# Patient Record
Sex: FEMALE | Race: WHITE | Employment: UNEMPLOYED | URBAN - METROPOLITAN AREA
[De-identification: names, ages, dates, MRNs, and addresses within clinical notes are randomized per-mention and may not be internally consistent; named-entity substitution may affect disease eponyms.]

---

## 2022-06-29 PROBLEM — R04.0 EPISTAXIS: Status: ACTIVE | Noted: 2022-06-29

## 2023-01-17 DIAGNOSIS — Z3A.24 24 WEEKS GESTATION OF PREGNANCY: ICD-10-CM

## 2023-01-17 DIAGNOSIS — G43.809 OTHER MIGRAINE WITHOUT STATUS MIGRAINOSUS, NOT INTRACTABLE: ICD-10-CM

## 2023-01-17 LAB
ALBUMIN SERPL-MCNC: 4 G/DL (ref 3.5–5.2)
ALP BLD-CCNC: 66 U/L (ref 35–104)
ALT SERPL-CCNC: 8 U/L (ref 0–32)
ANION GAP SERPL CALCULATED.3IONS-SCNC: 17 MMOL/L (ref 7–16)
AST SERPL-CCNC: 17 U/L (ref 0–31)
BACTERIA: ABNORMAL /HPF
BASOPHILS ABSOLUTE: 0.03 E9/L (ref 0–0.2)
BASOPHILS RELATIVE PERCENT: 0.3 % (ref 0–2)
BILIRUB SERPL-MCNC: 0.8 MG/DL (ref 0–1.2)
BILIRUBIN URINE: NEGATIVE
BLOOD, URINE: NEGATIVE
BUN BLDV-MCNC: 5 MG/DL (ref 6–20)
CALCIUM SERPL-MCNC: 9.6 MG/DL (ref 8.6–10.2)
CHLORIDE BLD-SCNC: 103 MMOL/L (ref 98–107)
CLARITY: CLEAR
CO2: 17 MMOL/L (ref 22–29)
COLOR: YELLOW
CREAT SERPL-MCNC: 0.5 MG/DL (ref 0.5–1)
CREATININE URINE: 114 MG/DL (ref 29–226)
EOSINOPHILS ABSOLUTE: 0.04 E9/L (ref 0.05–0.5)
EOSINOPHILS RELATIVE PERCENT: 0.4 % (ref 0–6)
EPITHELIAL CELLS, UA: ABNORMAL /HPF
FERRITIN: 30 NG/ML
FOLATE: >20 NG/ML (ref 4.8–24.2)
GFR SERPL CREATININE-BSD FRML MDRD: >60 ML/MIN/1.73
GLUCOSE BLD-MCNC: 91 MG/DL (ref 74–99)
GLUCOSE URINE: NEGATIVE MG/DL
HBA1C MFR BLD: 4.5 % (ref 4–5.6)
HCT VFR BLD CALC: 38 % (ref 34–48)
HEMOGLOBIN: 12.9 G/DL (ref 11.5–15.5)
IMMATURE GRANULOCYTES #: 0.04 E9/L
IMMATURE GRANULOCYTES %: 0.4 % (ref 0–5)
KETONES, URINE: NEGATIVE MG/DL
LACTATE DEHYDROGENASE: 194 U/L (ref 135–214)
LEUKOCYTE ESTERASE, URINE: NEGATIVE
LYMPHOCYTES ABSOLUTE: 2.31 E9/L (ref 1.5–4)
LYMPHOCYTES RELATIVE PERCENT: 23.8 % (ref 20–42)
MAGNESIUM: 1.7 MG/DL (ref 1.6–2.6)
MCH RBC QN AUTO: 32.6 PG (ref 26–35)
MCHC RBC AUTO-ENTMCNC: 33.9 % (ref 32–34.5)
MCV RBC AUTO: 96 FL (ref 80–99.9)
MONOCYTES ABSOLUTE: 0.54 E9/L (ref 0.1–0.95)
MONOCYTES RELATIVE PERCENT: 5.6 % (ref 2–12)
NEUTROPHILS ABSOLUTE: 6.76 E9/L (ref 1.8–7.3)
NEUTROPHILS RELATIVE PERCENT: 69.5 % (ref 43–80)
NITRITE, URINE: NEGATIVE
PDW BLD-RTO: 13 FL (ref 11.5–15)
PH UA: 6.5 (ref 5–9)
PLATELET # BLD: 313 E9/L (ref 130–450)
PMV BLD AUTO: 10.8 FL (ref 7–12)
POTASSIUM SERPL-SCNC: 4.3 MMOL/L (ref 3.5–5)
PROTEIN PROTEIN: 8 MG/DL (ref 0–12)
PROTEIN UA: NEGATIVE MG/DL
PROTEIN/CREAT RATIO: 0.1
PROTEIN/CREAT RATIO: 0.1 (ref 0–0.2)
RBC # BLD: 3.96 E12/L (ref 3.5–5.5)
RBC UA: ABNORMAL /HPF (ref 0–2)
SODIUM BLD-SCNC: 137 MMOL/L (ref 132–146)
SPECIFIC GRAVITY UA: 1.02 (ref 1–1.03)
T3 FREE: 2.8 PG/ML (ref 2–4.4)
T4 FREE: 0.96 NG/DL (ref 0.93–1.7)
TOTAL PROTEIN: 7.3 G/DL (ref 6.4–8.3)
TSH SERPL DL<=0.05 MIU/L-ACNC: 1.18 UIU/ML (ref 0.27–4.2)
URIC ACID, SERUM: 5.2 MG/DL (ref 2.4–5.7)
UROBILINOGEN, URINE: 0.2 E.U./DL
VITAMIN B-12: 172 PG/ML (ref 211–946)
VITAMIN D 25-HYDROXY: 32 NG/ML (ref 30–100)
WBC # BLD: 9.7 E9/L (ref 4.5–11.5)
WBC UA: ABNORMAL /HPF (ref 0–5)

## 2023-01-19 LAB — URINE CULTURE, ROUTINE: NORMAL

## 2023-01-20 PROBLEM — Z34.02 ENCOUNTER FOR SUPERVISION OF NORMAL FIRST PREGNANCY, SECOND TRIMESTER: Status: ACTIVE | Noted: 2023-01-20

## 2023-01-20 PROBLEM — E66.01 CLASS 3 SEVERE OBESITY DUE TO EXCESS CALORIES WITHOUT SERIOUS COMORBIDITY WITH BODY MASS INDEX (BMI) OF 40.0 TO 44.9 IN ADULT (HCC): Status: ACTIVE | Noted: 2023-01-20

## 2023-01-20 PROBLEM — E66.813 CLASS 3 SEVERE OBESITY DUE TO EXCESS CALORIES WITHOUT SERIOUS COMORBIDITY WITH BODY MASS INDEX (BMI) OF 40.0 TO 44.9 IN ADULT (HCC): Status: ACTIVE | Noted: 2023-01-20

## 2023-01-20 LAB
THYROGLOBULIN ANTIBODY: <12 IU/ML (ref 0–40)
THYROID PEROXIDASE (TPO) ABS: <4 IU/ML (ref 0–25)

## 2023-02-15 ENCOUNTER — ANCILLARY PROCEDURE (OUTPATIENT)
Dept: OBGYN CLINIC | Age: 23
End: 2023-02-15
Payer: COMMERCIAL

## 2023-02-15 ENCOUNTER — ROUTINE PRENATAL (OUTPATIENT)
Dept: OBGYN CLINIC | Age: 23
End: 2023-02-15
Payer: COMMERCIAL

## 2023-02-15 VITALS
BODY MASS INDEX: 41.11 KG/M2 | HEART RATE: 88 BPM | DIASTOLIC BLOOD PRESSURE: 83 MMHG | WEIGHT: 266.4 LBS | SYSTOLIC BLOOD PRESSURE: 124 MMHG

## 2023-02-15 DIAGNOSIS — R79.89 LOW VITAMIN D LEVEL: ICD-10-CM

## 2023-02-15 DIAGNOSIS — E53.8 VITAMIN B12 DEFICIENCY: ICD-10-CM

## 2023-02-15 DIAGNOSIS — R94.8 ABNORMAL PLACENTA FUNCTION TEST: ICD-10-CM

## 2023-02-15 DIAGNOSIS — Z34.03 PRIMIGRAVIDA IN THIRD TRIMESTER: ICD-10-CM

## 2023-02-15 DIAGNOSIS — E66.01 CLASS 3 SEVERE OBESITY DUE TO EXCESS CALORIES WITHOUT SERIOUS COMORBIDITY WITH BODY MASS INDEX (BMI) OF 40.0 TO 44.9 IN ADULT (HCC): ICD-10-CM

## 2023-02-15 DIAGNOSIS — Z3A.28 28 WEEKS GESTATION OF PREGNANCY: ICD-10-CM

## 2023-02-15 LAB
GLUCOSE URINE, POC: NORMAL
PROTEIN UA: NEGATIVE

## 2023-02-15 PROCEDURE — 81002 URINALYSIS NONAUTO W/O SCOPE: CPT | Performed by: OBSTETRICS & GYNECOLOGY

## 2023-02-15 PROCEDURE — 99214 OFFICE O/P EST MOD 30 MIN: CPT | Performed by: OBSTETRICS & GYNECOLOGY

## 2023-02-15 PROCEDURE — 76816 OB US FOLLOW-UP PER FETUS: CPT | Performed by: OBSTETRICS & GYNECOLOGY

## 2023-02-15 PROCEDURE — 76819 FETAL BIOPHYS PROFIL W/O NST: CPT | Performed by: OBSTETRICS & GYNECOLOGY

## 2023-02-15 PROCEDURE — 76820 UMBILICAL ARTERY ECHO: CPT | Performed by: OBSTETRICS & GYNECOLOGY

## 2023-02-15 PROCEDURE — 76821 MIDDLE CEREBRAL ARTERY ECHO: CPT | Performed by: OBSTETRICS & GYNECOLOGY

## 2023-02-15 PROCEDURE — 99213 OFFICE O/P EST LOW 20 MIN: CPT | Performed by: OBSTETRICS & GYNECOLOGY

## 2023-02-15 NOTE — PROGRESS NOTES
Denies any vaginal bleeding , cramping, or leaking of any fluids. Good fetal movement per patient. Urine for protein and glucose obtained with negative results.

## 2023-02-15 NOTE — LETTER
[facility] Maternal Fetal Medicine  [address]  [place] [address]  Phone: [phone]  Fax: [phone]           Malvin Veliz MD      February 15, 2023     Patient: Ashley Barry   MR Number: [id number]   YOB: 2000   Date of Visit: 2/15/2023       Dear Dr. Ash Wilson: Thank you for referring Ashley Barry to me for evaluation/treatment. Below are the relevant portions of my assessment and plan of care. If you have questions, please do not hesitate to call me. I look forward to following Shelly along with you. Sincerely,        Malvin Veliz MD    CC providers:  Derek Finley DO  [address] [place] [address] Floor  [facility] [place] [address]  Via In [facility]       February 15, 2023      Derek Finley DO  [address] [place] [address]     RE:  Tamar Kody  : 2000   AGE: 25 y.o. This report has been created using voice recognition software. It may contain errors which are inherent in voice recognition technology. Dear Dr. Ash Wilson:      I had the pleasure of meeting with Ms. Delano López for a return consultation. As you know, Ms. Delano López  is a 25 y.o.  at 29w1d ([address]) who is being followed by our office for multiple medical issues. Today, Ms. Delano López reports that she feels well. She notes good fetal movement and denies any symptoms of leaking of fluid, vaginal bleeding, and/or contractions. She had a fetal ultrasound that was notable for the following. There is a single intrauterine gestation in a cephalic presentation with a heart rate of 137 beats per minute. The placenta is anterior. The amniotic fluid index is 13.5 cm. The composite gestational age is 31w0d. The estimated fetal weight is at the 49th percentile. BPP 8/8. Umbilical artery Doppler studies are elevated. End-diastolic flow was seen. MCA PI normal.  MCA PSV normal.  CPR PI >1. Transabdominal cervical length 4.5 cm.       PERTINENT PHYSICAL EXAMINATION:   /83   Pulse 88   Wt 266 lb 6.4 oz (120.8 kg)   LMP 2022   BMI 41.11 kg/m²     Urine dipstick:   Negative for Glucose    Negative for Albumin      A fetal ultrasound assessment was performed today. A report is enclosed for your review. Assessment & Plan:  25 y.o.  at 28w1d ([address]) with:    1. Genetic counseling -- The patient was previously counseled regarding her options for genetic screening and/or diagnostic testing. Counseling was reviewed. The patient did not have any additional questions or concerns. The patient's options for genetic screening and/or diagnostic testing were reviewed including a quad screen, NIPT, and or amniocentesis. The risks and benefits of each were discussed. After this discussion, the patient indicated that she was undecided. A handout reviewing a panorama was provided. -- The patient declined genetic screening     The patient's prenatal record was reviewed. She had screening for cystic fibrosis that was negative. She was also counseled regarding the recommendations for screening for spinal muscular atrophy and Fragile X. The risks and benefits of screening were reviewed. After this discussion, she was undecided. She was provided a handout regarding a [employer] panel. -- The patient declined carrier screening        2. Obesity in pregnancy -- The patient reports that she is 5 foot 8 inches tall. She reports that her weight has remained stable. She was noted to weigh 260 pounds at 19 weeks 6 days. She weighed 266 pounds today. She has gained 1 pound since her last visit. Her BMI is 41. 11. Given the increased risks previously described, additional testing is recommended. Fetal growth should be monitored every 3-4 weeks starting at 24-26 weeks' gestation. Fetal growth was appropriate for the gestational age today. The patient should monitor fetal kick counts daily, starting at 28 weeks' gestation.   She should be scheduled for increased fetal surveillance with twice weekly fetal testing after 32 weeks' gestation. In the absence of any complications, delivery is recommended at 39 weeks' gestation. Given the increased risk for hypertensive disorders of pregnancy,  the potential utility of a low dose aspirin in reducing her risk for hypertensive disorders of pregnancy was reviewed. The risks and benefits of low dose aspirin were discussed. She was counseled that she could take 81 mg of aspirin, daily. A prescription was previously provided. Additional maternal evaluation was recommended with a nutrition panel, thyroid function studies, and a hemoglobin A1c. --Baseline testing was completed on 1/17/2023, the patient's results included: H/H12.9/38, MCV 96, platelet count 197,096, potassium 4.3, creatinine 0.5, calcium 9.6, ALT 8, AST 17, magnesium 1.7, ferritin 30, folate >20, vitamin B12 172, vitamin D 32, hemoglobin A1c 4.5%, TSH 1.18, free T40.96, free T32.8, TPO negative, antithyroglobulin antibody negative, , uric acid 5.2, urine culture no urinalysis negative, urine protein creatinine ratio 0.1.  --Additional recommendations are below. 3.   Migraine headaches -- The patient reported a history of migraine headaches. She reports that she was diagnosed 2 years ago. She follows with her PCP. She was using Imitrex as needed prior to pregnancy. She denies having any issues. Counseling was previously provided. Counseling was reviewed. The patient did not have any additional questions or concerns. The patient was counseled that migraine headaches can improve during pregnancy. However, in some patients symptoms are exacerbated. She was counseled regarding the use of magnesium oxide 400 mg twice daily and riboflavin 100 mg daily in reducing the frequency and intensity of migraine headaches.       Precautions were reviewed, and she was counseled that if she develops the worst headache of her life or a headache with neurological deficits, to present immediately for evaluation. She expressed verbal understanding of this counseling. Because of the patient's headache symptoms, a baseline nutrition panel and thyroid function testing was recommended. -- Baseline testing completed 1/17/2023. Results summarized above. Additional recommendations are below. 4.  Low vitamin D -- The patient's vitamin D was low at 32  --Recommend vitamin D3 2000 IU daily  --Monitor levels serially  --Monitor nutrition panel q4-6 weeks (CBC, CMP, magnesium, ferritin, folate, vitamin B12, vitamin D25OH), on/after 2/28/2023  --Follow up with PCP for long term monitoring and management    5. Vitamin B12 deficiency -- The patient's vitamin B12 was deficient at 172  --Recommend vitamin B12 1000 mcg PO daily  --Monitor levels serially  --Monitor nutrition panel q4-6 weeks (CBC, CMP, magnesium, ferritin, folate, vitamin B12, vitamin D25OH), repeat testing on/after 2/28/2023  --Follow up with PCP for long term monitoring and management      6. Elevated umbilical artery PI -- The umbilical artery PI was elevated. End-diastolic flow was seen. Overall, fetal growth was appropriate for gestational age. The patient was counseled that this finding can be indicative of placental dysfunction. It also may be associated with an increased risk for poor fetal growth, hypertensive disorders of pregnancy, and stillbirth. Thus, increased fetal surveillance is recommended. The patient was counseled to monitor fetal kick counts daily, instructions were reviewed. The patient should have a follow-up ultrasound weekly for biophysical profiles and repeat Doppler studies. Fetal growth should be reassessed in 2-3 weeks.       She should start twice-weekly testing at 32 weeks gestation, weekly ultrasound and weekly nonstress test.      --I requested the patient return for a follow-up assessment in 1 week unless there is a clinical reason for her to return prior to that time. She is to call if she has any problems or questions prior to her next visit. Further evaluation and management will be dependent on her clinical presentation and the results of her testing. --The patient was advised to call if she has any increased vaginal discharge, vaginal bleeding, contractions, abdominal pain, back pain or any new significant symptomatology prior to her next visit. I advised her that these are signs and symptoms of cervical change and require follow-up assessment when they occur. Preeclampsia precautions were also reviewed with the patient. --The patient was also counseled to call and/or return with any concerns for decreased fetal activity. --The patient is to continue to follow with you in your office for ongoing obstetric care. --The total time spent on today's visit was 30 minutes. This included preparation for the visit (i.e. reviewing prior external notes and test results), performance of a medically appropriate history and examination, counseling, orders for medications, tests or other procedures, and coordination of care. Greater than 50% of the time was spent face-to-face with the patient. This time is exclusive of procedures performed. I answered all of  the patient's questions to her satisfaction. I asked her to call if she had any additional questions prior to her next visit. --At the conclusion of the visit, the patient appeared to have a good understanding of the issues discussed. I answered all of her questions to her satisfaction. I asked her to call if she had any additional questions prior to her next visit. --Thank you for allowing me to participate in the care of this pleasant patient. Please don't hesitate to call me if you have any questions.       Sincerely,      Doug Smith MD, [facility] [address]  [phone]      *All or parts of this note may have been generated using a voice recognition program. There may be typo, grammar, or Word substitution errors that have escaped my review of this note.

## 2023-02-20 ENCOUNTER — ROUTINE PRENATAL (OUTPATIENT)
Dept: OBGYN CLINIC | Age: 23
End: 2023-02-20
Payer: COMMERCIAL

## 2023-02-20 ENCOUNTER — ANCILLARY PROCEDURE (OUTPATIENT)
Dept: OBGYN CLINIC | Age: 23
End: 2023-02-20
Payer: COMMERCIAL

## 2023-02-20 VITALS
SYSTOLIC BLOOD PRESSURE: 132 MMHG | WEIGHT: 272 LBS | BODY MASS INDEX: 41.97 KG/M2 | HEART RATE: 104 BPM | DIASTOLIC BLOOD PRESSURE: 82 MMHG

## 2023-02-20 DIAGNOSIS — Z3A.28 28 WEEKS GESTATION OF PREGNANCY: ICD-10-CM

## 2023-02-20 DIAGNOSIS — E53.8 VITAMIN B12 DEFICIENCY: ICD-10-CM

## 2023-02-20 DIAGNOSIS — R80.9 URINE PROTEIN INCREASED: ICD-10-CM

## 2023-02-20 DIAGNOSIS — R94.8 ABNORMAL PLACENTA FUNCTION TEST: ICD-10-CM

## 2023-02-20 DIAGNOSIS — E66.01 CLASS 3 SEVERE OBESITY DUE TO EXCESS CALORIES WITHOUT SERIOUS COMORBIDITY WITH BODY MASS INDEX (BMI) OF 40.0 TO 44.9 IN ADULT (HCC): Primary | ICD-10-CM

## 2023-02-20 DIAGNOSIS — R79.89 LOW VITAMIN D LEVEL: ICD-10-CM

## 2023-02-20 LAB
GLUCOSE URINE, POC: NORMAL
PROTEIN UA: POSITIVE

## 2023-02-20 PROCEDURE — 76821 MIDDLE CEREBRAL ARTERY ECHO: CPT | Performed by: OBSTETRICS & GYNECOLOGY

## 2023-02-20 PROCEDURE — 99213 OFFICE O/P EST LOW 20 MIN: CPT | Performed by: OBSTETRICS & GYNECOLOGY

## 2023-02-20 PROCEDURE — 76819 FETAL BIOPHYS PROFIL W/O NST: CPT | Performed by: OBSTETRICS & GYNECOLOGY

## 2023-02-20 PROCEDURE — 76820 UMBILICAL ARTERY ECHO: CPT | Performed by: OBSTETRICS & GYNECOLOGY

## 2023-02-20 PROCEDURE — 76815 OB US LIMITED FETUS(S): CPT | Performed by: OBSTETRICS & GYNECOLOGY

## 2023-02-20 PROCEDURE — 81002 URINALYSIS NONAUTO W/O SCOPE: CPT | Performed by: OBSTETRICS & GYNECOLOGY

## 2023-02-20 NOTE — PROGRESS NOTES
2023      Allison Ham DO  [address]     RE:  Ramo Ramires  : 2000   AGE: 25 y.o. This report has been created using voice recognition software. It may contain errors which are inherent in voice recognition technology. Dear Dr. Page Echeverria:      I had the pleasure of meeting with Ms. Vergara President for a return consultation. As you know, Ms. Vergara President  is a 25 y.o.  at 30w11d ([address] [place] [address]) who is being followed by our office for multiple medical issues. Today, Ms. Vergara President reports that she feels well. She notes good fetal movement and denies any symptoms of leaking of fluid, vaginal bleeding, and/or contractions. She had a fetal ultrasound that was notable for the following. There is a single intrauterine gestation in a cephalic presentation with a heart rate of 153 beats per minute. The placenta is anterior. The amniotic fluid index is 19.9 cm. BPP 8/8. Umbilical artery PI is intermittently elevated. End-diastolic flow is seen. MCA PI normal.  MCA PSV normal.  CPR PI >1.      PERTINENT PHYSICAL EXAMINATION:   /82   Pulse (!) 104   Wt 272 lb (123.4 kg)   LMP 2022   BMI 41.97 kg/m²     Urine dipstick:   Negative for Glucose    Trace for Albumin      A fetal ultrasound assessment was performed today. A report is enclosed for your review. Assessment & Plan:  25 y.o.  at 30w11d ([facility]) with:    1. Genetic counseling -- The patient was previously counseled regarding her options for genetic screening and/or diagnostic testing. Counseling was reviewed. The patient did not have any additional questions or concerns. The patient's options for genetic screening and/or diagnostic testing were reviewed including a quad screen, NIPT, and or amniocentesis. The risks and benefits of each were discussed. After this discussion, the patient indicated that she was undecided. A handout reviewing a panorama was provided.   -- The patient declined genetic screening     The patient's prenatal record was reviewed. She had screening for cystic fibrosis that was negative. She was also counseled regarding the recommendations for screening for spinal muscular atrophy and Fragile X. The risks and benefits of screening were reviewed. After this discussion, she was undecided. She was provided a handout regarding a [employer] panel. -- The patient declined carrier screening        2. Obesity in pregnancy -- The patient reports that she is 5 foot 8 inches tall. She reports that her weight has remained stable. She was noted to weigh 260 pounds at 19 weeks 6 days. She weighed 272 pounds today. She has gained 6 pounds since her last visit. Her BMI is 41.97. Given the increased risks previously described, additional testing is recommended. Fetal growth should be monitored every 3-4 weeks starting at 24-26 weeks' gestation. Fetal growth was appropriate for the gestational age at 35 weeks 1 day. he patient should monitor fetal kick counts daily, starting at 28 weeks' gestation. Instructions were reviewed. She should be scheduled for increased fetal surveillance with twice weekly fetal testing after 32 weeks' gestation. In the absence of any complications, delivery is recommended at 39 weeks' gestation. Given the increased risk for hypertensive disorders of pregnancy,  the potential utility of a low dose aspirin in reducing her risk for hypertensive disorders of pregnancy was reviewed. The risks and benefits of low dose aspirin were discussed. She was counseled that she could take 81 mg of aspirin, daily. A prescription was previously provided. Additional maternal evaluation was recommended with a nutrition panel, thyroid function studies, and a hemoglobin A1c.    --Baseline testing was completed on 1/17/2023, the patient's results included: H/H12.9/38, MCV 96, platelet count 040,407, potassium 4.3, creatinine 0.5, calcium 9.6, ALT 8, AST 17, magnesium 1.7, ferritin 30, folate >20, vitamin B12 172, vitamin D 32, hemoglobin A1c 4.5%, TSH 1.18, free T40.96, free T32.8, TPO negative, antithyroglobulin antibody negative, , uric acid 5.2, urine culture no urinalysis negative, urine protein creatinine ratio 0.1.  --Additional recommendations are below. 3.   Migraine headaches -- The patient reported a history of migraine headaches. She reports that she was diagnosed 2 years ago. She follows with her PCP. She was using Imitrex as needed prior to pregnancy. She again denies having any issues with migraine headaches. Counseling was previously provided. Counseling was reviewed. The patient did not have any additional questions or concerns. The patient was counseled that migraine headaches can improve during pregnancy. However, in some patients symptoms are exacerbated. She was counseled regarding the use of magnesium oxide 400 mg twice daily and riboflavin 100 mg daily in reducing the frequency and intensity of migraine headaches. Precautions were reviewed, and she was counseled that if she develops the worst headache of her life or a headache with neurological deficits, to present immediately for evaluation. She expressed verbal understanding of this counseling. Because of the patient's headache symptoms, a baseline nutrition panel and thyroid function testing was recommended. -- Baseline testing completed 1/17/2023. Results summarized above. Additional recommendations are below. 4.  Low vitamin D -- The patient's vitamin D was low at 32  --Recommend vitamin D3 2000 IU daily  --Monitor levels serially  --Monitor nutrition panel q4-6 weeks (CBC, CMP, magnesium, ferritin, folate, vitamin B12, vitamin D25OH), on/after 2/28/2023  --Follow up with PCP for long term monitoring and management     5.   Vitamin B12 deficiency -- The patient's vitamin B12 was deficient at 172  --Recommend vitamin B12 1000 mcg PO daily  --Monitor levels serially  --Monitor nutrition panel q4-6 weeks (CBC, CMP, magnesium, ferritin, folate, vitamin B12, vitamin D25OH), repeat testing on/after 2/28/2023  --Follow up with PCP for long term monitoring and management      6. Elevated umbilical artery PI -- The umbilical artery PI was noted to be elevated at 28 weeks 1 day. End-diastolic flow was seen. Overall, fetal growth was appropriate for gestational age. Counseling was provided to the patient. Counseling was reviewed. The patient was counseled that this finding can be indicative of placental dysfunction. It also may be associated with an increased risk for poor fetal growth, hypertensive disorders of pregnancy, and stillbirth. Thus, increased fetal surveillance is recommended. The patient returns today for follow-up testing. The umbilical artery PI was again intermittently elevated. End-diastolic flow was seen. MCA PI was normal.  The CPR PI was >1. The patient was counseled to monitor fetal kick counts daily, instructions were reviewed. The patient should have a follow-up ultrasound weekly for biophysical profiles and repeat Doppler studies. Fetal growth should be reassessed in 2-3 weeks. She should start twice-weekly testing at 32 weeks gestation, weekly ultrasound and weekly nonstress test.      7. Proteinuria -- The patient was noted to have trace protein on a urine dip today. She was normotensive with a blood pressure of 132/82 and she denied any signs or symptoms of preeclampsia. She denied any signs or symptoms of a urinary tract infection. She was given orders to have a urine culture and urine P/C ratio. If any abnormalities are detected, she will be contacted with results and follow-up recommendations. Preeclampsia precautions were reviewed with the patient.         --I requested the patient return for a follow-up assessment in 1 week unless there is a clinical reason for her to return prior to that time. She is to call if she has any problems or questions prior to her next visit. Further evaluation and management will be dependent on her clinical presentation and the results of her testing. --The patient was advised to call if she has any increased vaginal discharge, vaginal bleeding, contractions, abdominal pain, back pain or any new significant symptomatology prior to her next visit. I advised her that these are signs and symptoms of cervical change and require follow-up assessment when they occur. Preeclampsia precautions were also reviewed with the patient. --The patient was also counseled to call and/or return with any concerns for decreased fetal activity. --The patient is to continue to follow with you in your office for ongoing obstetric care. --The total time spent on today's visit was 20 minutes. This included preparation for the visit (i.e. reviewing prior external notes and test results), performance of a medically appropriate history and examination, counseling, orders for medications, tests or other procedures, and coordination of care. Greater than 50% of the time was spent face-to-face with the patient. This time is exclusive of procedures performed. I answered all of  the patient's questions to her satisfaction. I asked her to call if she had any additional questions prior to her next visit. --At the conclusion of the visit, the patient appeared to have a good understanding of the issues discussed. I answered all of her questions to her satisfaction. I asked her to call if she had any additional questions prior to her next visit. --Thank you for allowing me to participate in the care of this pleasant patient. Please don't hesitate to call me if you have any questions.       Sincerely,      Donny Moulton MD, [facility]  [phone]      *All or parts of this note may have been generated using a voice recognition program. There may be typo, grammar, or Word substitution errors that have escaped my review of this note.

## 2023-02-20 NOTE — LETTER
[address] Maternal Fetal Medicine  [address] [place] [facility] [address]  Phone: [phone]  Fax: [phone]           Fernando Flores MD      2023     Patient: Brenna Lopez   MR Number: [id number]   YOB: 2000   Date of Visit: 2023       Dear Dr. Bell Bass: Thank you for referring Brenna Lopez to me for evaluation/treatment. Below are the relevant portions of my assessment and plan of care. If you have questions, please do not hesitate to call me. I look forward to following Shelly along with you. Sincerely,        Fernando Flores MD    CC providers:  Vincent Langston DO  [address] [place] [address] Floor  [facility] [place] [address]  Via In [facility]       2023      Vincent Langston DO  [address] [place] [address]     RE:  Cady Barnard  : 2000   AGE: 25 y.o. This report has been created using voice recognition software. It may contain errors which are inherent in voice recognition technology. Dear Dr. Bell Bass:      I had the pleasure of meeting with Ms. Noe Meyers for a return consultation. As you know, Ms. Noe Meyers  is a 25 y.o.  at 30w11d ([address]) who is being followed by our office for multiple medical issues. Today, Ms. Noe Meyers reports that she feels well. She notes good fetal movement and denies any symptoms of leaking of fluid, vaginal bleeding, and/or contractions. She had a fetal ultrasound that was notable for the following. There is a single intrauterine gestation in a cephalic presentation with a heart rate of 153 beats per minute. The placenta is anterior. The amniotic fluid index is 19.9 cm. BPP 8/8. Umbilical artery PI is intermittently elevated. End-diastolic flow is seen.   MCA PI normal.  MCA PSV normal.  CPR PI >1.      PERTINENT PHYSICAL EXAMINATION:   /82   Pulse (!) 104   Wt 272 lb (123.4 kg)   LMP 2022   BMI 41.97 kg/m²     Urine dipstick: Negative for Glucose    Trace for Albumin      A fetal ultrasound assessment was performed today. A report is enclosed for your review. Assessment & Plan:  25 y.o.  at 30w11d ([address] [facility]) with:    1. Genetic counseling -- The patient was previously counseled regarding her options for genetic screening and/or diagnostic testing. Counseling was reviewed. The patient did not have any additional questions or concerns. The patient's options for genetic screening and/or diagnostic testing were reviewed including a quad screen, NIPT, and or amniocentesis. The risks and benefits of each were discussed. After this discussion, the patient indicated that she was undecided. A handout reviewing a panorama was provided. -- The patient declined genetic screening     The patient's prenatal record was reviewed. She had screening for cystic fibrosis that was negative. She was also counseled regarding the recommendations for screening for spinal muscular atrophy and Fragile X. The risks and benefits of screening were reviewed. After this discussion, she was undecided. She was provided a handout regarding a [employer] panel. -- The patient declined carrier screening        2. Obesity in pregnancy -- The patient reports that she is 5 foot 8 inches tall. She reports that her weight has remained stable. She was noted to weigh 260 pounds at 19 weeks 6 days. She weighed 272 pounds today. She has gained 6 pounds since her last visit. Her BMI is 41.97. Given the increased risks previously described, additional testing is recommended. Fetal growth should be monitored every 3-4 weeks starting at 24-26 weeks' gestation. Fetal growth was appropriate for the gestational age at 35 weeks 1 day. he patient should monitor fetal kick counts daily, starting at 28 weeks' gestation. Instructions were reviewed.   She should be scheduled for increased fetal surveillance with twice weekly fetal testing after 32 weeks' gestation. In the absence of any complications, delivery is recommended at 39 weeks' gestation. Given the increased risk for hypertensive disorders of pregnancy,  the potential utility of a low dose aspirin in reducing her risk for hypertensive disorders of pregnancy was reviewed. The risks and benefits of low dose aspirin were discussed. She was counseled that she could take 81 mg of aspirin, daily. A prescription was previously provided. Additional maternal evaluation was recommended with a nutrition panel, thyroid function studies, and a hemoglobin A1c. --Baseline testing was completed on 1/17/2023, the patient's results included: H/H12.9/38, MCV 96, platelet count 942,558, potassium 4.3, creatinine 0.5, calcium 9.6, ALT 8, AST 17, magnesium 1.7, ferritin 30, folate >20, vitamin B12 172, vitamin D 32, hemoglobin A1c 4.5%, TSH 1.18, free T40.96, free T32.8, TPO negative, antithyroglobulin antibody negative, , uric acid 5.2, urine culture no urinalysis negative, urine protein creatinine ratio 0.1.  --Additional recommendations are below. 3.   Migraine headaches -- The patient reported a history of migraine headaches. She reports that she was diagnosed 2 years ago. She follows with her PCP. She was using Imitrex as needed prior to pregnancy. She again denies having any issues with migraine headaches. Counseling was previously provided. Counseling was reviewed. The patient did not have any additional questions or concerns. The patient was counseled that migraine headaches can improve during pregnancy. However, in some patients symptoms are exacerbated. She was counseled regarding the use of magnesium oxide 400 mg twice daily and riboflavin 100 mg daily in reducing the frequency and intensity of migraine headaches.        Precautions were reviewed, and she was counseled that if she develops the worst headache of her life or a headache with neurological deficits, to present immediately for evaluation. She expressed verbal understanding of this counseling. Because of the patient's headache symptoms, a baseline nutrition panel and thyroid function testing was recommended. -- Baseline testing completed 1/17/2023. Results summarized above. Additional recommendations are below. 4.  Low vitamin D -- The patient's vitamin D was low at 32  --Recommend vitamin D3 2000 IU daily  --Monitor levels serially  --Monitor nutrition panel q4-6 weeks (CBC, CMP, magnesium, ferritin, folate, vitamin B12, vitamin D25OH), on/after 2/28/2023  --Follow up with PCP for long term monitoring and management     5. Vitamin B12 deficiency -- The patient's vitamin B12 was deficient at 172  --Recommend vitamin B12 1000 mcg PO daily  --Monitor levels serially  --Monitor nutrition panel q4-6 weeks (CBC, CMP, magnesium, ferritin, folate, vitamin B12, vitamin D25OH), repeat testing on/after 2/28/2023  --Follow up with PCP for long term monitoring and management      6. Elevated umbilical artery PI -- The umbilical artery PI was noted to be elevated at 28 weeks 1 day. End-diastolic flow was seen. Overall, fetal growth was appropriate for gestational age. Counseling was provided to the patient. Counseling was reviewed. The patient was counseled that this finding can be indicative of placental dysfunction. It also may be associated with an increased risk for poor fetal growth, hypertensive disorders of pregnancy, and stillbirth. Thus, increased fetal surveillance is recommended. The patient returns today for follow-up testing. The umbilical artery PI was again intermittently elevated. End-diastolic flow was seen. MCA PI was normal.  The CPR PI was >1. The patient was counseled to monitor fetal kick counts daily, instructions were reviewed. The patient should have a follow-up ultrasound weekly for biophysical profiles and repeat Doppler studies.        Fetal growth should be reassessed in 2-3 weeks. She should start twice-weekly testing at 32 weeks gestation, weekly ultrasound and weekly nonstress test.      7. Proteinuria -- The patient was noted to have trace protein on a urine dip today. She was normotensive with a blood pressure of 132/82 and she denied any signs or symptoms of preeclampsia. She denied any signs or symptoms of a urinary tract infection. She was given orders to have a urine culture and urine P/C ratio. If any abnormalities are detected, she will be contacted with results and follow-up recommendations. Preeclampsia precautions were reviewed with the patient. --I requested the patient return for a follow-up assessment in 1 week unless there is a clinical reason for her to return prior to that time. She is to call if she has any problems or questions prior to her next visit. Further evaluation and management will be dependent on her clinical presentation and the results of her testing. --The patient was advised to call if she has any increased vaginal discharge, vaginal bleeding, contractions, abdominal pain, back pain or any new significant symptomatology prior to her next visit. I advised her that these are signs and symptoms of cervical change and require follow-up assessment when they occur. Preeclampsia precautions were also reviewed with the patient. --The patient was also counseled to call and/or return with any concerns for decreased fetal activity. --The patient is to continue to follow with you in your office for ongoing obstetric care. --The total time spent on today's visit was 20 minutes. This included preparation for the visit (i.e. reviewing prior external notes and test results), performance of a medically appropriate history and examination, counseling, orders for medications, tests or other procedures, and coordination of care. Greater than 50% of the time was spent face-to-face with the patient.   This time is exclusive of procedures performed. I answered all of  the patient's questions to her satisfaction. I asked her to call if she had any additional questions prior to her next visit. --At the conclusion of the visit, the patient appeared to have a good understanding of the issues discussed. I answered all of her questions to her satisfaction. I asked her to call if she had any additional questions prior to her next visit. --Thank you for allowing me to participate in the care of this pleasant patient. Please don't hesitate to call me if you have any questions. Sincerely,      Joshua Blackwell MD, [facility] [address]  [phone]      *All or parts of this note may have been generated using a voice recognition program. There may be typo, grammar, or Word substitution errors that have escaped my review of this note.

## 2023-02-24 DIAGNOSIS — Z34.02 ENCOUNTER FOR SUPERVISION OF NORMAL FIRST PREGNANCY, SECOND TRIMESTER: ICD-10-CM

## 2023-02-27 ENCOUNTER — ROUTINE PRENATAL (OUTPATIENT)
Dept: OBGYN | Age: 23
End: 2023-02-27
Payer: COMMERCIAL

## 2023-02-27 VITALS
WEIGHT: 273 LBS | SYSTOLIC BLOOD PRESSURE: 138 MMHG | DIASTOLIC BLOOD PRESSURE: 75 MMHG | HEART RATE: 110 BPM | BODY MASS INDEX: 42.13 KG/M2

## 2023-02-27 DIAGNOSIS — Z34.93 ENCOUNTER FOR SUPERVISION OF NORMAL PREGNANCY IN THIRD TRIMESTER, UNSPECIFIED GRAVIDITY: Primary | ICD-10-CM

## 2023-02-27 PROBLEM — E53.8 VITAMIN B12 DEFICIENCY: Status: ACTIVE | Noted: 2023-02-27

## 2023-02-27 PROBLEM — R79.89 LOW VITAMIN D LEVEL: Status: ACTIVE | Noted: 2023-02-27

## 2023-02-27 PROBLEM — R94.8 ABNORMAL PLACENTA FUNCTION TEST: Status: ACTIVE | Noted: 2023-02-27

## 2023-02-27 PROBLEM — R80.9 URINE PROTEIN INCREASED: Status: ACTIVE | Noted: 2023-02-27

## 2023-02-27 LAB
ABO/RH: NORMAL
ANTIBODY SCREEN: NORMAL
GLUCOSE TOLERANCE SCREEN 50G: 156 MG/DL (ref 70–140)
GLUCOSE URINE, POC: NORMAL
HCT VFR BLD CALC: 38 % (ref 34–48)
HEMOGLOBIN: 12.8 G/DL (ref 11.5–15.5)
MCH RBC QN AUTO: 31.7 PG (ref 26–35)
MCHC RBC AUTO-ENTMCNC: 33.7 % (ref 32–34.5)
MCV RBC AUTO: 94.1 FL (ref 80–99.9)
PDW BLD-RTO: 12.4 FL (ref 11.5–15)
PLATELET # BLD: 285 E9/L (ref 130–450)
PMV BLD AUTO: 11.2 FL (ref 7–12)
PROTEIN UA: NEGATIVE
RBC # BLD: 4.04 E12/L (ref 3.5–5.5)
WBC # BLD: 9.8 E9/L (ref 4.5–11.5)

## 2023-02-27 PROCEDURE — 99213 OFFICE O/P EST LOW 20 MIN: CPT | Performed by: MIDWIFE

## 2023-02-27 PROCEDURE — 81002 URINALYSIS NONAUTO W/O SCOPE: CPT | Performed by: MIDWIFE

## 2023-02-27 SDOH — ECONOMIC STABILITY: FOOD INSECURITY: WITHIN THE PAST 12 MONTHS, YOU WORRIED THAT YOUR FOOD WOULD RUN OUT BEFORE YOU GOT MONEY TO BUY MORE.: NEVER TRUE

## 2023-02-27 SDOH — ECONOMIC STABILITY: FOOD INSECURITY: WITHIN THE PAST 12 MONTHS, THE FOOD YOU BOUGHT JUST DIDN'T LAST AND YOU DIDN'T HAVE MONEY TO GET MORE.: NEVER TRUE

## 2023-02-27 SDOH — ECONOMIC STABILITY: INCOME INSECURITY: HOW HARD IS IT FOR YOU TO PAY FOR THE VERY BASICS LIKE FOOD, HOUSING, MEDICAL CARE, AND HEATING?: NOT HARD AT ALL

## 2023-02-27 SDOH — ECONOMIC STABILITY: HOUSING INSECURITY
IN THE LAST 12 MONTHS, WAS THERE A TIME WHEN YOU DID NOT HAVE A STEADY PLACE TO SLEEP OR SLEPT IN A SHELTER (INCLUDING NOW)?: NO

## 2023-02-27 NOTE — PROGRESS NOTES
/Patient alert and pleasant with no complaints. Here today for prenatal visit. Fetal heart tones obtained without difficulty. Urine for glucose  3+ and protein obtained with negative results. Discharge instructions have been discussed with the patient. Patient advised to call our office with any questions or concerns. Voiced understanding.

## 2023-02-27 NOTE — PROGRESS NOTES
, return OB at 29w6d weeks    Patient Active Problem List   Diagnosis    Epistaxis    Encounter for supervision of normal first pregnancy, second trimester    Class 3 severe obesity due to excess calories without serious comorbidity with body mass index (BMI) of 40.0 to 44.9 in adult [facility])    Low vitamin D level    Vitamin B12 deficiency    Abnormal placenta function test    Urine protein increased        Subjective:  doing well    Bleeding no   Leaking of fluid no   Painful cramping/contractions no   Headache no   Epigastric pain no   Edema no     Fetal movement good, patient reports 10 movements in 2 hours    Objective:  See flowsheet    Vitals:    23 0938   BP: 138/75   Pulse: (!) 110       FH 28  FHT per flowsheet    1 hour GTT and CBC was done today. CBC WNL  O positive, negative antibody screen   1 hour   She had 3+ glucose in her urine. Assessment:   at 29w6d   Blood pressure WNL  Size equals dates  Total weight gain appropriate   rhogam:  Not eligible    ICD-10-CM    1. Encounter for supervision of normal pregnancy in third trimester, unspecified   Z34.93 POCT urine qual dipstick glucose     POCT urine qual dipstick protein           Plan:    RTO 2 weeks    Orders Placed This Encounter   Procedures    POCT urine qual dipstick glucose    POCT urine qual dipstick protein      Fetal movement:  Baby should move 10 times every 2 hours. If movements decrease below 10 in 2 hours, or decrease from what is typical for that pregnancy, patient should eat something, drink something, and lay down to count movements. If she does not feel 10 movements after doing these things, she is to immediately proceed to L&D for NST. She should NOT WAIT until the next day.  labor precautions discussed with patient. Patient should report >4 contractions/tightenings in 1 hour after first emptying bladder, laying down, drinking 2 large glasses of water.   Should also promptly report any vaginal bleeding, menstrual-type cramping, dysuria, urgency or frequency.

## 2023-02-28 LAB — RPR: NORMAL

## 2023-03-02 ENCOUNTER — ANCILLARY PROCEDURE (OUTPATIENT)
Dept: OBGYN CLINIC | Age: 23
End: 2023-03-02
Payer: COMMERCIAL

## 2023-03-02 ENCOUNTER — ROUTINE PRENATAL (OUTPATIENT)
Dept: OBGYN CLINIC | Age: 23
End: 2023-03-02
Payer: COMMERCIAL

## 2023-03-02 VITALS
HEART RATE: 72 BPM | DIASTOLIC BLOOD PRESSURE: 79 MMHG | BODY MASS INDEX: 42.13 KG/M2 | WEIGHT: 273 LBS | SYSTOLIC BLOOD PRESSURE: 129 MMHG

## 2023-03-02 DIAGNOSIS — R94.8 ABNORMAL PLACENTA FUNCTION TEST: ICD-10-CM

## 2023-03-02 DIAGNOSIS — Z34.02 ENCOUNTER FOR SUPERVISION OF NORMAL FIRST PREGNANCY, SECOND TRIMESTER: Primary | ICD-10-CM

## 2023-03-02 DIAGNOSIS — E66.01 CLASS 3 SEVERE OBESITY DUE TO EXCESS CALORIES WITHOUT SERIOUS COMORBIDITY WITH BODY MASS INDEX (BMI) OF 40.0 TO 44.9 IN ADULT (HCC): ICD-10-CM

## 2023-03-02 DIAGNOSIS — R80.9 URINE PROTEIN INCREASED: ICD-10-CM

## 2023-03-02 DIAGNOSIS — R79.89 LOW VITAMIN D LEVEL: ICD-10-CM

## 2023-03-02 DIAGNOSIS — E53.8 VITAMIN B12 DEFICIENCY: ICD-10-CM

## 2023-03-02 LAB
GLUCOSE URINE, POC: NORMAL
PROTEIN UA: NEGATIVE

## 2023-03-02 PROCEDURE — 99214 OFFICE O/P EST MOD 30 MIN: CPT | Performed by: OBSTETRICS & GYNECOLOGY

## 2023-03-02 PROCEDURE — 76816 OB US FOLLOW-UP PER FETUS: CPT | Performed by: OBSTETRICS & GYNECOLOGY

## 2023-03-02 PROCEDURE — 99213 OFFICE O/P EST LOW 20 MIN: CPT | Performed by: OBSTETRICS & GYNECOLOGY

## 2023-03-02 PROCEDURE — 76820 UMBILICAL ARTERY ECHO: CPT | Performed by: OBSTETRICS & GYNECOLOGY

## 2023-03-02 PROCEDURE — 76821 MIDDLE CEREBRAL ARTERY ECHO: CPT | Performed by: OBSTETRICS & GYNECOLOGY

## 2023-03-02 PROCEDURE — 81002 URINALYSIS NONAUTO W/O SCOPE: CPT | Performed by: OBSTETRICS & GYNECOLOGY

## 2023-03-02 PROCEDURE — 76819 FETAL BIOPHYS PROFIL W/O NST: CPT | Performed by: OBSTETRICS & GYNECOLOGY

## 2023-03-02 NOTE — PROGRESS NOTES
2023      Elias Alfred DO  [address]     RE:  Sara Fontenot  : 2000   AGE: 25 y.o. This report has been created using voice recognition software. It may contain errors which are inherent in voice recognition technology. Dear Dr. Araseli Zapata:      I had the pleasure of meeting with Ms. Macrina Lund for a return consultation. As you know, Ms. Macrina Lund  is a 25 y.o.  at 27w4d ([address]) who is being followed by our office for multiple medical issues. Today, Ms. Macrina Lund reports that she feels well. She notes good fetal movement and denies any symptoms of leaking of fluid, vaginal bleeding, and/or contractions. She had a fetal ultrasound that was notable for the following. There is a single intrauterine gestation in a cephalic presentation with a heart rate of 59 beats per minute. The placenta is anterior. The amniotic fluid index is 16.6 cm. The composite gestational age is 30w3d. The estimated fetal weight is at the 56th percentile. BPP 8/8. Umbilical artery PI intermittently elevated. Diastolic closed. MCA has been normal.  MCA PI normal.  CPR PI normal.      PERTINENT PHYSICAL EXAMINATION:   /79   Pulse 72   Wt 273 lb (123.8 kg)   LMP 2022   BMI 42.13 kg/m²     Urine dipstick:   Negative for Glucose    Negative for Albumin      A fetal ultrasound assessment was performed today. A report is enclosed for your review. Assessment & Plan:  25 y.o.  at 30w2d (7-week US) with:    1. Genetic counseling -- The patient was previously counseled regarding her options for genetic screening and/or diagnostic testing. Counseling was reviewed. The patient did not have any additional questions or concerns. The patient's options for genetic screening and/or diagnostic testing were reviewed including a quad screen, NIPT, and or amniocentesis. The risks and benefits of each were discussed.   After this discussion, the patient indicated that she was undecided. A handout reviewing a panorama was provided. -- The patient declined genetic screening     The patient's prenatal record was reviewed. She had screening for cystic fibrosis that was negative. She was also counseled regarding the recommendations for screening for spinal muscular atrophy and Fragile X. The risks and benefits of screening were reviewed. After this discussion, she was undecided. She was provided a handout regarding a [employer] panel. -- The patient declined carrier screening        2. Obesity in pregnancy -- The patient reports that she is 5 foot 8 inches tall. She reports that her weight has remained stable. She was noted to weigh 260 pounds at 19 weeks 6 days. She weighed 273 pounds today. She has gained 1 pound since her last visit. Her BMI is 42. 13. Given the increased risks previously described, additional testing is recommended. Fetal growth should be monitored every 3-4 weeks starting at 24-26 weeks' gestation. Fetal growth was appropriate for the gestational age at 31 weeks 2 day. Umbilical artery PI was intermittently elevated. See below. The patient should monitor fetal kick counts daily, starting at 28 weeks' gestation. Instructions were reviewed. She should be scheduled for increased fetal surveillance with twice weekly fetal testing after 32 weeks' gestation. In the absence of any complications, delivery is recommended at 39 weeks' gestation. Given the increased risk for hypertensive disorders of pregnancy,  the potential utility of a low dose aspirin in reducing her risk for hypertensive disorders of pregnancy was reviewed. The risks and benefits of low dose aspirin were discussed. She was counseled that she could take 81 mg of aspirin, daily. A prescription was previously provided. Additional maternal evaluation was recommended with a nutrition panel, thyroid function studies, and a hemoglobin A1c.    --Baseline testing was completed on 1/17/2023, the patient's results included: H/H12.9/38, MCV 96, platelet count 693,391, potassium 4.3, creatinine 0.5, calcium 9.6, ALT 8, AST 17, magnesium 1.7, ferritin 30, folate >20, vitamin B12 172, vitamin D 32, hemoglobin A1c 4.5%, TSH 1.18, free T40.96, free T32.8, TPO negative, antithyroglobulin antibody negative, , uric acid 5.2, urine culture no urinalysis negative, urine protein creatinine ratio 0.1.  --Additional recommendations are below. 3.   Migraine headaches -- The patient reported a history of migraine headaches. She reports that she was diagnosed 2 years ago. She follows with her PCP. She was using Imitrex as needed prior to pregnancy. She again denies having any issues with migraine headaches. Counseling was previously provided. Counseling was reviewed. The patient did not have any additional questions or concerns. The patient was counseled that migraine headaches can improve during pregnancy. However, in some patients symptoms are exacerbated. She was counseled regarding the use of magnesium oxide 400 mg twice daily and riboflavin 100 mg daily in reducing the frequency and intensity of migraine headaches. Precautions were reviewed, and she was counseled that if she develops the worst headache of her life or a headache with neurological deficits, to present immediately for evaluation. She expressed verbal understanding of this counseling. Because of the patient's headache symptoms, a baseline nutrition panel and thyroid function testing was recommended. -- Baseline testing completed 1/17/2023. Results summarized above. Additional recommendations are below.      4.  Low vitamin D -- The patient's vitamin D was low at 32  --Recommend vitamin D3 2000 IU daily  --Monitor levels serially  --Monitor nutrition panel q4-6 weeks (CBC, CMP, magnesium, ferritin, folate, vitamin B12, vitamin D25OH), on/after 2/28/2023  --Follow up with PCP for long term monitoring and management     5. Vitamin B12 deficiency -- The patient's vitamin B12 was deficient at 172  --Recommend vitamin B12 1000 mcg PO daily  --Monitor levels serially  --Monitor nutrition panel q4-6 weeks (CBC, CMP, magnesium, ferritin, folate, vitamin B12, vitamin D25OH), repeat testing on/after 2/28/2023  --Follow up with PCP for long term monitoring and management      6. Elevated umbilical artery PI -- The umbilical artery PI was noted to be elevated at 28 weeks 1 day. End-diastolic flow was seen. Overall, fetal growth was appropriate for gestational age. Testing was repeated at 30 weeks 2 days. The umbilical artery PI was again intermittently elevated. End-diastolic flow was seen. Fetal testing was otherwise reassuring. Physical profile score 8/8, a normal ARASELI, and a normal MCA PI. Counseling was previously provided to the patient. Counseling was reviewed. The patient was counseled that this finding can be indicative of placental dysfunction. It also may be associated with an increased risk for poor fetal growth, hypertensive disorders of pregnancy, and stillbirth. Thus, increased fetal surveillance is recommended. The patient was counseled continue a daily low-dose aspirin. The patient was counseled to monitor fetal kick counts daily, instructions were reviewed. The patient should have a follow-up ultrasound weekly for biophysical profiles and repeat Doppler studies. Fetal growth should be reassessed in 2 weeks. She should start twice-weekly testing at 32 weeks gestation, weekly ultrasound and weekly nonstress test.        7. Proteinuria -- The patient was noted to have trace protein on a urine dip at 20 weeks 6 days. She was normotensive with a blood pressure of 132/82 and she denied any signs or symptoms of preeclampsia. She denied any signs or symptoms of a urinary tract infection.   She was given orders to have a urine culture and urine P/C ratio. If any abnormalities are detected, she will be contacted with results and follow-up recommendations. The patient's urine negative for protein today. Her blood pressure was normal at 129/79. Preeclampsia precautions were reviewed with the patient. --I requested the patient return for a follow-up assessment in 1 week unless there is a clinical reason for her to return prior to that time. She is to call if she has any problems or questions prior to her next visit. Further evaluation and management will be dependent on her clinical presentation and the results of her testing. --The patient was advised to call if she has any increased vaginal discharge, vaginal bleeding, contractions, abdominal pain, back pain or any new significant symptomatology prior to her next visit. I advised her that these are signs and symptoms of cervical change and require follow-up assessment when they occur. Preeclampsia precautions were also reviewed with the patient. --The patient was also counseled to call and/or return with any concerns for decreased fetal activity. --The patient is to continue to follow with you in your office for ongoing obstetric care. --The total time spent on today's visit was 30 minutes. This included preparation for the visit (i.e. reviewing prior external notes and test results), performance of a medically appropriate history and examination, counseling, orders for medications, tests or other procedures, and coordination of care. Greater than 50% of the time was spent face-to-face with the patient. This time is exclusive of procedures performed. I answered all of  the patient's questions to her satisfaction. I asked her to call if she had any additional questions prior to her next visit. --At the conclusion of the visit, the patient appeared to have a good understanding of the issues discussed. I answered all of her questions to her satisfaction.  I asked her to call if she had any additional questions prior to her next visit. --Thank you for allowing me to participate in the care of this pleasant patient. Please don't hesitate to call me if you have any questions. Sincerely,      Myra Hood MD, [address] [facility]  [phone] [address], 3rd floor, [facility], [address] [place] [address]      *All or parts of this note may have been generated using a voice recognition program. There may be typo, grammar, or Word substitution errors that have escaped my review of this note.

## 2023-03-02 NOTE — LETTER
2023      Yayo Hadley DO  [address] [facility]  3rd [address] [facility]      Patient: Tamar Villanueva   MR Number: [id number]   YOB: 2000   Date of Visit: 3/2/2023       Dear Dr. Erika Manley: Thank you for referring Tamar Villanueva to me for evaluation/treatment. Below are the relevant portions of my assessment and plan of care. If you have questions, please do not hesitate to call me. I look forward to following Shelly along with you. Sincerely,        Rosemary Khalil MD    CC providers:  Yayo Hadley DO  [address] [address]  Via In Basket     2023      Yayopalmira Hadley DO  [address] [place] [address]     RE:  Mita Fuchs  : 2000   AGE: 25 y.o. This report has been created using voice recognition software. It may contain errors which are inherent in voice recognition technology. Dear Dr. Erika Manley:      I had the pleasure of meeting with Ms. Addi Sanon for a return consultation. As you know, Ms. Addi Sanon  is a 25 y.o.  at 27w4d ([address]) who is being followed by our office for multiple medical issues. Today, Ms. Addi Sanon reports that she feels well. She notes good fetal movement and denies any symptoms of leaking of fluid, vaginal bleeding, and/or contractions. She had a fetal ultrasound that was notable for the following. There is a single intrauterine gestation in a cephalic presentation with a heart rate of 59 beats per minute. The placenta is anterior. The amniotic fluid index is 16.6 cm. The composite gestational age is 30w3d. The estimated fetal weight is at the 56th percentile. BPP 8/8. Umbilical artery PI intermittently elevated. Diastolic closed.   MCA has been normal.  MCA PI normal.  CPR PI normal.      PERTINENT PHYSICAL EXAMINATION:   /79   Pulse 72   Wt 273 lb (123.8 kg)   LMP 2022   BMI 42.13 kg/m²     Urine dipstick:   Negative for Glucose    Negative for Albumin      A fetal ultrasound assessment was performed today. A report is enclosed for your review. Assessment & Plan:  25 y.o.  at 30w2d (7-week US) with:    1. Genetic counseling -- The patient was previously counseled regarding her options for genetic screening and/or diagnostic testing. Counseling was reviewed. The patient did not have any additional questions or concerns. The patient's options for genetic screening and/or diagnostic testing were reviewed including a quad screen, NIPT, and or amniocentesis. The risks and benefits of each were discussed. After this discussion, the patient indicated that she was undecided. A handout reviewing a panorama was provided. -- The patient declined genetic screening     The patient's prenatal record was reviewed. She had screening for cystic fibrosis that was negative. She was also counseled regarding the recommendations for screening for spinal muscular atrophy and Fragile X. The risks and benefits of screening were reviewed. After this discussion, she was undecided. She was provided a handout regarding a [employer] panel. -- The patient declined carrier screening        2. Obesity in pregnancy -- The patient reports that she is 5 foot 8 inches tall. She reports that her weight has remained stable. She was noted to weigh 260 pounds at 19 weeks 6 days. She weighed 273 pounds today. She has gained 1 pound since her last visit. Her BMI is 42. 13. Given the increased risks previously described, additional testing is recommended. Fetal growth should be monitored every 3-4 weeks starting at 24-26 weeks' gestation. Fetal growth was appropriate for the gestational age at 31 weeks 2 day. Umbilical artery PI was intermittently elevated. See below. The patient should monitor fetal kick counts daily, starting at 28 weeks' gestation. Instructions were reviewed.   She should be scheduled for increased fetal surveillance with twice weekly fetal testing after 32 weeks' gestation. In the absence of any complications, delivery is recommended at 39 weeks' gestation. Given the increased risk for hypertensive disorders of pregnancy,  the potential utility of a low dose aspirin in reducing her risk for hypertensive disorders of pregnancy was reviewed. The risks and benefits of low dose aspirin were discussed. She was counseled that she could take 81 mg of aspirin, daily. A prescription was previously provided. Additional maternal evaluation was recommended with a nutrition panel, thyroid function studies, and a hemoglobin A1c. --Baseline testing was completed on 1/17/2023, the patient's results included: H/H12.9/38, MCV 96, platelet count 459,032, potassium 4.3, creatinine 0.5, calcium 9.6, ALT 8, AST 17, magnesium 1.7, ferritin 30, folate >20, vitamin B12 172, vitamin D 32, hemoglobin A1c 4.5%, TSH 1.18, free T40.96, free T32.8, TPO negative, antithyroglobulin antibody negative, , uric acid 5.2, urine culture no urinalysis negative, urine protein creatinine ratio 0.1.  --Additional recommendations are below. 3.   Migraine headaches -- The patient reported a history of migraine headaches. She reports that she was diagnosed 2 years ago. She follows with her PCP. She was using Imitrex as needed prior to pregnancy. She again denies having any issues with migraine headaches. Counseling was previously provided. Counseling was reviewed. The patient did not have any additional questions or concerns. The patient was counseled that migraine headaches can improve during pregnancy. However, in some patients symptoms are exacerbated. She was counseled regarding the use of magnesium oxide 400 mg twice daily and riboflavin 100 mg daily in reducing the frequency and intensity of migraine headaches.        Precautions were reviewed, and she was counseled that if she develops the worst headache of her life or a headache with neurological deficits, to present immediately for evaluation.  She expressed verbal understanding of this counseling.     Because of the patient's headache symptoms, a baseline nutrition panel and thyroid function testing was recommended.    -- Baseline testing completed 1/17/2023.  Results summarized above.  Additional recommendations are below.     4.  Low vitamin D -- The patient's vitamin D was low at 32  --Recommend vitamin D3 2000 IU daily  --Monitor levels serially  --Monitor nutrition panel q4-6 weeks (CBC, CMP, magnesium, ferritin, folate, vitamin B12, vitamin D25OH), on/after 2/28/2023  --Follow up with PCP for long term monitoring and management     5.  Vitamin B12 deficiency -- The patient's vitamin B12 was deficient at 172  --Recommend vitamin B12 1000 mcg PO daily  --Monitor levels serially  --Monitor nutrition panel q4-6 weeks (CBC, CMP, magnesium, ferritin, folate, vitamin B12, vitamin D25OH), repeat testing on/after 2/28/2023  --Follow up with PCP for long term monitoring and management      6.  Elevated umbilical artery PI -- The umbilical artery PI was noted to be elevated at 28 weeks 1 day.  End-diastolic flow was seen. Overall, fetal growth was appropriate for gestational age.      Testing was repeated at 30 weeks 2 days.  The umbilical artery PI was again intermittently elevated.  End-diastolic flow was seen.    Fetal testing was otherwise reassuring.  Physical profile score 8/8, a normal ARASELI, and a normal MCA PI.     Counseling was previously provided to the patient.  Counseling was reviewed.     The patient was counseled that this finding can be indicative of placental dysfunction. It also may be associated with an increased risk for poor fetal growth, hypertensive disorders of pregnancy, and stillbirth. Thus, increased fetal surveillance is recommended.       The patient was counseled continue a daily low-dose aspirin.     The patient was counseled to monitor fetal kick counts  daily, instructions were reviewed. The patient should have a follow-up ultrasound weekly for biophysical profiles and repeat Doppler studies. Fetal growth should be reassessed in 2 weeks. She should start twice-weekly testing at 32 weeks gestation, weekly ultrasound and weekly nonstress test.        7. Proteinuria -- The patient was noted to have trace protein on a urine dip at 20 weeks 6 days. She was normotensive with a blood pressure of 132/82 and she denied any signs or symptoms of preeclampsia. She denied any signs or symptoms of a urinary tract infection. She was given orders to have a urine culture and urine P/C ratio. If any abnormalities are detected, she will be contacted with results and follow-up recommendations. The patient's urine negative for protein today. Her blood pressure was normal at 129/79. Preeclampsia precautions were reviewed with the patient. --I requested the patient return for a follow-up assessment in 1 week unless there is a clinical reason for her to return prior to that time. She is to call if she has any problems or questions prior to her next visit. Further evaluation and management will be dependent on her clinical presentation and the results of her testing. --The patient was advised to call if she has any increased vaginal discharge, vaginal bleeding, contractions, abdominal pain, back pain or any new significant symptomatology prior to her next visit. I advised her that these are signs and symptoms of cervical change and require follow-up assessment when they occur. Preeclampsia precautions were also reviewed with the patient. --The patient was also counseled to call and/or return with any concerns for decreased fetal activity. --The patient is to continue to follow with you in your office for ongoing obstetric care. --The total time spent on today's visit was 30 minutes.   This included preparation for the visit (i.e. reviewing prior external notes and test results), performance of a medically appropriate history and examination, counseling, orders for medications, tests or other procedures, and coordination of care. Greater than 50% of the time was spent face-to-face with the patient. This time is exclusive of procedures performed. I answered all of  the patient's questions to her satisfaction. I asked her to call if she had any additional questions prior to her next visit. --At the conclusion of the visit, the patient appeared to have a good understanding of the issues discussed. I answered all of her questions to her satisfaction. I asked her to call if she had any additional questions prior to her next visit. --Thank you for allowing me to participate in the care of this pleasant patient. Please don't hesitate to call me if you have any questions. Sincerely,      Evens Amezcua MD, [address] [facility]  [phone] option 555 [address], 3rd floor, [facility], [address] [place] [address]      *All or parts of this note may have been generated using a voice recognition program. There may be typo, grammar, or Word substitution errors that have escaped my review of this note.

## 2023-03-10 ENCOUNTER — ROUTINE PRENATAL (OUTPATIENT)
Dept: OBGYN CLINIC | Age: 23
End: 2023-03-10
Payer: COMMERCIAL

## 2023-03-10 ENCOUNTER — TELEPHONE (OUTPATIENT)
Dept: OBGYN | Age: 23
End: 2023-03-10

## 2023-03-10 ENCOUNTER — ANCILLARY PROCEDURE (OUTPATIENT)
Dept: OBGYN CLINIC | Age: 23
End: 2023-03-10
Payer: COMMERCIAL

## 2023-03-10 VITALS
SYSTOLIC BLOOD PRESSURE: 123 MMHG | HEART RATE: 74 BPM | BODY MASS INDEX: 41.66 KG/M2 | DIASTOLIC BLOOD PRESSURE: 73 MMHG | WEIGHT: 270 LBS

## 2023-03-10 DIAGNOSIS — Z3A.31 31 WEEKS GESTATION OF PREGNANCY: ICD-10-CM

## 2023-03-10 DIAGNOSIS — E63.9 NUTRITIONAL DEFICIENCY: ICD-10-CM

## 2023-03-10 DIAGNOSIS — R80.9 URINE PROTEIN INCREASED: ICD-10-CM

## 2023-03-10 DIAGNOSIS — Z34.02 ENCOUNTER FOR SUPERVISION OF NORMAL FIRST PREGNANCY, SECOND TRIMESTER: ICD-10-CM

## 2023-03-10 DIAGNOSIS — R79.89 LOW VITAMIN D LEVEL: ICD-10-CM

## 2023-03-10 DIAGNOSIS — R94.8 ABNORMAL PLACENTA FUNCTION TEST: ICD-10-CM

## 2023-03-10 DIAGNOSIS — E66.01 CLASS 3 SEVERE OBESITY DUE TO EXCESS CALORIES WITHOUT SERIOUS COMORBIDITY WITH BODY MASS INDEX (BMI) OF 40.0 TO 44.9 IN ADULT (HCC): ICD-10-CM

## 2023-03-10 DIAGNOSIS — E53.8 VITAMIN B12 DEFICIENCY: ICD-10-CM

## 2023-03-10 DIAGNOSIS — Z34.93 ENCOUNTER FOR SUPERVISION OF NORMAL PREGNANCY IN THIRD TRIMESTER, UNSPECIFIED GRAVIDITY: Primary | ICD-10-CM

## 2023-03-10 DIAGNOSIS — Z34.93 ENCOUNTER FOR SUPERVISION OF NORMAL PREGNANCY IN THIRD TRIMESTER, UNSPECIFIED GRAVIDITY: ICD-10-CM

## 2023-03-10 DIAGNOSIS — Z34.02 ENCOUNTER FOR SUPERVISION OF NORMAL FIRST PREGNANCY, SECOND TRIMESTER: Primary | ICD-10-CM

## 2023-03-10 LAB
ALBUMIN SERPL-MCNC: 3.4 G/DL (ref 3.5–5.2)
ALP BLD-CCNC: 137 U/L (ref 35–104)
ALT SERPL-CCNC: 10 U/L (ref 0–32)
ANION GAP SERPL CALCULATED.3IONS-SCNC: 16 MMOL/L (ref 7–16)
AST SERPL-CCNC: 21 U/L (ref 0–31)
BACTERIA: ABNORMAL /HPF
BASOPHILS ABSOLUTE: 0.03 E9/L (ref 0–0.2)
BASOPHILS RELATIVE PERCENT: 0.3 % (ref 0–2)
BILIRUB SERPL-MCNC: 1.2 MG/DL (ref 0–1.2)
BILIRUBIN URINE: NEGATIVE
BLOOD, URINE: NEGATIVE
BUN BLDV-MCNC: 7 MG/DL (ref 6–20)
CALCIUM SERPL-MCNC: 9.1 MG/DL (ref 8.6–10.2)
CHLORIDE BLD-SCNC: 104 MMOL/L (ref 98–107)
CLARITY: CLEAR
CO2: 17 MMOL/L (ref 22–29)
COLOR: YELLOW
CREAT SERPL-MCNC: 0.5 MG/DL (ref 0.5–1)
CREATININE URINE: 124 MG/DL (ref 29–226)
EOSINOPHILS ABSOLUTE: 0.04 E9/L (ref 0.05–0.5)
EOSINOPHILS RELATIVE PERCENT: 0.4 % (ref 0–6)
EPITHELIAL CELLS, UA: ABNORMAL /HPF
FERRITIN: 16 NG/ML
FOLATE: 19.1 NG/ML (ref 4.8–24.2)
GFR SERPL CREATININE-BSD FRML MDRD: >60 ML/MIN/1.73
GLUCOSE BLD-MCNC: 72 MG/DL (ref 74–99)
GLUCOSE URINE, POC: NEGATIVE
GLUCOSE URINE: NEGATIVE MG/DL
HBA1C MFR BLD: 5.3 % (ref 4–5.6)
HCT VFR BLD CALC: 39.3 % (ref 34–48)
HEMOGLOBIN: 13.2 G/DL (ref 11.5–15.5)
IMMATURE GRANULOCYTES #: 0.03 E9/L
IMMATURE GRANULOCYTES %: 0.3 % (ref 0–5)
KETONES, URINE: ABNORMAL MG/DL
LACTATE DEHYDROGENASE: 248 U/L (ref 135–214)
LEUKOCYTE ESTERASE, URINE: NEGATIVE
LYMPHOCYTES ABSOLUTE: 2.17 E9/L (ref 1.5–4)
LYMPHOCYTES RELATIVE PERCENT: 20 % (ref 20–42)
MAGNESIUM: 1.8 MG/DL (ref 1.6–2.6)
MCH RBC QN AUTO: 31.4 PG (ref 26–35)
MCHC RBC AUTO-ENTMCNC: 33.6 % (ref 32–34.5)
MCV RBC AUTO: 93.3 FL (ref 80–99.9)
MONOCYTES ABSOLUTE: 0.71 E9/L (ref 0.1–0.95)
MONOCYTES RELATIVE PERCENT: 6.6 % (ref 2–12)
NEUTROPHILS ABSOLUTE: 7.85 E9/L (ref 1.8–7.3)
NEUTROPHILS RELATIVE PERCENT: 72.4 % (ref 43–80)
NITRITE, URINE: NEGATIVE
PDW BLD-RTO: 12.6 FL (ref 11.5–15)
PH UA: 6.5 (ref 5–9)
PLATELET # BLD: 344 E9/L (ref 130–450)
PMV BLD AUTO: 11.2 FL (ref 7–12)
POTASSIUM SERPL-SCNC: 4.5 MMOL/L (ref 3.5–5)
PROTEIN PROTEIN: 10 MG/DL (ref 0–12)
PROTEIN UA: NEGATIVE
PROTEIN UA: NEGATIVE MG/DL
PROTEIN/CREAT RATIO: 0.1
PROTEIN/CREAT RATIO: 0.1 (ref 0–0.2)
RBC # BLD: 4.21 E12/L (ref 3.5–5.5)
RBC UA: ABNORMAL /HPF (ref 0–2)
REASON FOR REJECTION: NORMAL
REJECTED TEST: NORMAL
SODIUM BLD-SCNC: 137 MMOL/L (ref 132–146)
SPECIFIC GRAVITY UA: 1.02 (ref 1–1.03)
T4 FREE: 1.08 NG/DL (ref 0.93–1.7)
TOTAL PROTEIN: 7 G/DL (ref 6.4–8.3)
TSH SERPL DL<=0.05 MIU/L-ACNC: 0.91 UIU/ML (ref 0.27–4.2)
URIC ACID, SERUM: 5.2 MG/DL (ref 2.4–5.7)
UROBILINOGEN, URINE: 0.2 E.U./DL
VITAMIN B-12: 330 PG/ML (ref 211–946)
VITAMIN D 25-HYDROXY: 34 NG/ML (ref 30–100)
WBC # BLD: 10.8 E9/L (ref 4.5–11.5)
WBC UA: ABNORMAL /HPF (ref 0–5)

## 2023-03-10 PROCEDURE — 76821 MIDDLE CEREBRAL ARTERY ECHO: CPT | Performed by: OBSTETRICS & GYNECOLOGY

## 2023-03-10 PROCEDURE — 99213 OFFICE O/P EST LOW 20 MIN: CPT | Performed by: OBSTETRICS & GYNECOLOGY

## 2023-03-10 PROCEDURE — 76819 FETAL BIOPHYS PROFIL W/O NST: CPT | Performed by: OBSTETRICS & GYNECOLOGY

## 2023-03-10 PROCEDURE — 81002 URINALYSIS NONAUTO W/O SCOPE: CPT | Performed by: OBSTETRICS & GYNECOLOGY

## 2023-03-10 PROCEDURE — 76815 OB US LIMITED FETUS(S): CPT | Performed by: OBSTETRICS & GYNECOLOGY

## 2023-03-10 PROCEDURE — 93976 VASCULAR STUDY: CPT | Performed by: OBSTETRICS & GYNECOLOGY

## 2023-03-10 PROCEDURE — 76820 UMBILICAL ARTERY ECHO: CPT | Performed by: OBSTETRICS & GYNECOLOGY

## 2023-03-10 NOTE — LETTER
March 10, 2023      Mil Mckenna DO  [address] [facility]  3rd [address] [place] [address]      Patient: Marilyn Phalen   MR Number: [id number]   YOB: 2000   Date of Visit: 3/10/2023       Dear Dr. Yareli Kilgore: Thank you for referring Marilyn Phalen to me for evaluation/treatment. Below are the relevant portions of my assessment and plan of care. If you have questions, please do not hesitate to call me. I look forward to following Shelly along with you. Sincerely,        Nathalie Vitale MD    CC providers:  Mil Mckenna   [address] 3rd Floor [facility] [place] [address]  Via In [facility]     March 10, 2023      Mil Mckenna DO  [address]     RE:  Noris Ferrell  : 2000   AGE: 25 y.o. This report has been created using voice recognition software. It may contain errors which are inherent in voice recognition technology. Dear Dr. Yareli Kilgore:      I had the pleasure of meeting with Ms. Shilpi Shields for a return consultation. As you know, Ms. Shilpi Shields  is a 25 y.o.  at 35w4d ([address]) who is being followed by our office for multiple medical issues. Today, Ms. Shilpi Shields reports that she feels well. She notes good fetal movement and denies any symptoms of leaking of fluid, vaginal bleeding, and/or contractions. She had a fetal ultrasound that was notable for the following. There is a single intrauterine gestation in a cephalic presentation with a heart rate of 149 beats per minute. The placenta is anterior. The amniotic fluid index is 14.7 cm. BPP 8/8. Umbilical artery PI elevated. End-diastolic seen. MCA Dopplers normal.      PERTINENT PHYSICAL EXAMINATION:   /73   Pulse 74   Wt 270 lb (122.5 kg)   LMP 2022   BMI 41.66 kg/m²     Urine dipstick:   Negative for Glucose    Negative for Albumin      A fetal ultrasound assessment was performed today.  A report is enclosed for your review.    Assessment & Plan:  22 y.o.  at 31w3d (7 WK US) with:    1.  Genetic counseling -- The patient was previously counseled regarding her options for genetic screening and/or diagnostic testing.  Counseling was reviewed.  The patient did not have any additional questions or concerns.     The patient's options for genetic screening and/or diagnostic testing were reviewed including a quad screen, NIPT, and or amniocentesis.  The risks and benefits of each were discussed.  After this discussion, the patient indicated that she was undecided.  A handout reviewing a panorama was provided.  -- The patient declined genetic screening     The patient's prenatal record was reviewed.  She had screening for cystic fibrosis that was negative.  She was also counseled regarding the recommendations for screening for spinal muscular atrophy and Fragile X.  The risks and benefits of screening were reviewed.  After this discussion, she was undecided.  She was provided a handout regarding a [employer] panel.  -- The patient declined carrier screening        2.  Obesity in pregnancy -- The patient reports that she is 5 foot 8 inches tall.  She reports that her weight has remained stable.  She was noted to weigh 260 pounds at 19 weeks 6 days.  She weighed 270 pounds today.  She has lost 3 pounds since her last visit.  Her BMI is 41.66.     Given the increased risks previously described, additional testing is recommended.  Fetal growth should be monitored every 3-4 weeks starting at 24-26 weeks' gestation.  Fetal growth was appropriate for the gestational age at 30 weeks 2 day.  The umbilical artery PI was intermittently elevated.  See below.    The patient should monitor fetal kick counts daily, starting at 28 weeks' gestation.  Instructions were reviewed.  She should be scheduled for increased fetal surveillance with twice weekly fetal testing after 32 weeks' gestation.  In the absence of any complications, delivery is  recommended at 39 weeks' gestation. Given the increased risk for hypertensive disorders of pregnancy,  the potential utility of a low dose aspirin in reducing her risk for hypertensive disorders of pregnancy was reviewed. The risks and benefits of low dose aspirin were discussed. She was counseled that she could take 81 mg of aspirin, daily. A prescription was previously provided. Additional maternal evaluation was recommended with a nutrition panel, thyroid function studies, and a hemoglobin A1c. --Baseline testing was completed on 1/17/2023, the patient's results included: H/H12.9/38, MCV 96, platelet count 780,563, potassium 4.3, creatinine 0.5, calcium 9.6, ALT 8, AST 17, magnesium 1.7, ferritin 30, folate >20, vitamin B12 172, vitamin D 32, hemoglobin A1c 4.5%, TSH 1.18, free T40.96, free T32.8, TPO negative, antithyroglobulin antibody negative, , uric acid 5.2, urine culture no urinalysis negative, urine protein creatinine ratio 0.1.  --Additional recommendations are below. 3.   Migraine headaches -- The patient reported a history of migraine headaches. She reports that she was diagnosed 2 years ago. She follows with her PCP. She was using Imitrex as needed prior to pregnancy. She again denies having any issues with migraine headaches. Counseling was previously provided. Counseling was reviewed. The patient did not have any additional questions or concerns. The patient was counseled that migraine headaches can improve during pregnancy. However, in some patients symptoms are exacerbated. She was counseled regarding the use of magnesium oxide 400 mg twice daily and riboflavin 100 mg daily in reducing the frequency and intensity of migraine headaches. Precautions were reviewed, and she was counseled that if she develops the worst headache of her life or a headache with neurological deficits, to present immediately for evaluation.   She expressed verbal understanding of this counseling. Because of the patient's headache symptoms, a baseline nutrition panel and thyroid function testing was recommended. -- Baseline testing completed 1/17/2023. Results summarized above. Additional recommendations are below. 4.  Low vitamin D -- The patient's vitamin D was low at 32  --Recommend vitamin D3 2000 IU daily  --Monitor levels serially  --Monitor nutrition panel q4-6 weeks (CBC, CMP, magnesium, ferritin, folate, vitamin B12, vitamin D25OH), on/after 2/28/2023    -- Repeat testing ordered  --Follow up with PCP for long term monitoring and management     5. Vitamin B12 deficiency -- The patient's vitamin B12 was deficient at 172  --Recommend vitamin B12 1000 mcg PO daily  --Monitor levels serially  --Monitor nutrition panel q4-6 weeks (CBC, CMP, magnesium, ferritin, folate, vitamin B12, vitamin D25OH), repeat testing on/after 2/28/2023    -- Repeat testing ordered  --Follow up with PCP for long term monitoring and management      6. Elevated umbilical artery PI -- The umbilical artery PI was first noted to be elevated at 28 weeks 1 day. End-diastolic flow was seen. Overall, fetal growth was appropriate for gestational age. Testing was repeated at 30 weeks 2 days. The umbilical artery PI was again intermittently elevated. End-diastolic flow was seen. The umbilical artery PI was again elevated at 31 weeks 3 days. End-diastolic flow was seen. Fetal testing was otherwise reassuring. A biophysical profile score was 8/8, ARASELI was normal, and the MCA Doppler studies were normal.     Counseling was previously provided to the patient. Counseling was reviewed. The patient was counseled that this finding can be indicative of placental dysfunction. It also may be associated with an increased risk for poor fetal growth, hypertensive disorders of pregnancy, and stillbirth. Thus, increased fetal surveillance is recommended.        The patient was counseled continue a daily low-dose aspirin. The patient was counseled to monitor fetal kick counts daily, instructions were reviewed. The patient should have a follow-up ultrasound weekly for biophysical profiles and repeat Doppler studies. Fetal growth should be reassessed in 1 week. She should start twice-weekly testing at 32 weeks gestation, weekly ultrasound and weekly nonstress test.        7. Proteinuria -- The patient was previously noted to have trace protein on a urine dip at 20 weeks 6 days. She was normotensive with a blood pressure of 132/82 and she denied any signs or symptoms of preeclampsia. She denied any signs or symptoms of a urinary tract infection. She was given orders to have a urine culture and urine P/C ratio. If any abnormalities are detected, she will be contacted with results and follow-up recommendations. The patient's urine negative for protein today. Her blood pressure was normal at 123/73. Preeclampsia precautions were reviewed with the patient. --The patient was scheduled for nonstress tests on Tuesdays and ultrasound testing on Fridays. --I requested the patient return for a follow-up assessment in 1 week unless there is a clinical reason for her to return prior to that time. She is to call if she has any problems or questions prior to her next visit. Further evaluation and management will be dependent on her clinical presentation and the results of her testing. --The patient was advised to call if she has any increased vaginal discharge, vaginal bleeding, contractions, abdominal pain, back pain or any new significant symptomatology prior to her next visit. I advised her that these are signs and symptoms of cervical change and require follow-up assessment when they occur. Preeclampsia precautions were also reviewed with the patient. --The patient was also counseled to call and/or return with any concerns for decreased fetal activity.     --The patient is to continue to follow with you in your office for ongoing obstetric care. --The total time spent on today's visit was 20 minutes. This included preparation for the visit (i.e. reviewing prior external notes and test results), performance of a medically appropriate history and examination, counseling, orders for medications, tests or other procedures, and coordination of care. Greater than 50% of the time was spent face-to-face with the patient. This time is exclusive of procedures performed. I answered all of  the patient's questions to her satisfaction. I asked her to call if she had any additional questions prior to her next visit. --At the conclusion of the visit, the patient appeared to have a good understanding of the issues discussed. I answered all of her questions to her satisfaction. I asked her to call if she had any additional questions prior to her next visit. --Thank you for allowing me to participate in the care of this pleasant patient. Please don't hesitate to call me if you have any questions. Sincerely,      Sudheer Pierre MD, [address] [facility]  [phone] option 555 [address], 3rd floor, [facility], [address] [place] [address]      *All or parts of this note may have been generated using a voice recognition program. There may be typo, grammar, or Word substitution errors that have escaped my review of this note.

## 2023-03-10 NOTE — PROGRESS NOTES
March 10, 2023      Andrew Gilmore DO  [address]     RE:  Hilda Calhoun  : 2000   AGE: 25 y.o. This report has been created using voice recognition software. It may contain errors which are inherent in voice recognition technology. Dear Dr. Arun Perez:      I had the pleasure of meeting with Ms. Kindra Gimenez for a return consultation. As you know, Ms. Kindra Gimenez  is a 25 y.o.  at 35w4d ([address] [address]) who is being followed by our office for multiple medical issues. Today, Ms. Kindra Gimenez reports that she feels well. She notes good fetal movement and denies any symptoms of leaking of fluid, vaginal bleeding, and/or contractions. She had a fetal ultrasound that was notable for the following. There is a single intrauterine gestation in a cephalic presentation with a heart rate of 149 beats per minute. The placenta is anterior. The amniotic fluid index is 14.7 cm. BPP 8/8. Umbilical artery PI elevated. End-diastolic seen. MCA Dopplers normal.      PERTINENT PHYSICAL EXAMINATION:   /73   Pulse 74   Wt 270 lb (122.5 kg)   LMP 2022   BMI 41.66 kg/m²     Urine dipstick:   Negative for Glucose    Negative for Albumin      A fetal ultrasound assessment was performed today. A report is enclosed for your review. Assessment & Plan:  25 y.o.  at 35w4d ([address]) with:    1. Genetic counseling -- The patient was previously counseled regarding her options for genetic screening and/or diagnostic testing. Counseling was reviewed. The patient did not have any additional questions or concerns. The patient's options for genetic screening and/or diagnostic testing were reviewed including a quad screen, NIPT, and or amniocentesis. The risks and benefits of each were discussed. After this discussion, the patient indicated that she was undecided. A handout reviewing a panorama was provided.   -- The patient declined genetic screening     The patient's prenatal record was reviewed. She had screening for cystic fibrosis that was negative. She was also counseled regarding the recommendations for screening for spinal muscular atrophy and Fragile X. The risks and benefits of screening were reviewed. After this discussion, she was undecided. She was provided a handout regarding a Horizon panel. -- The patient declined carrier screening        2. Obesity in pregnancy -- The patient reports that she is 5 foot 8 inches tall. She reports that her weight has remained stable. She was noted to weigh 260 pounds at 19 weeks 6 days. She weighed 270 pounds today. She has lost 3 pounds since her last visit. Her BMI is 41.66. Given the increased risks previously described, additional testing is recommended. Fetal growth should be monitored every 3-4 weeks starting at 24-26 weeks' gestation. Fetal growth was appropriate for the gestational age at 31 weeks 2 day. The umbilical artery PI was intermittently elevated. See below. The patient should monitor fetal kick counts daily, starting at 28 weeks' gestation. Instructions were reviewed. She should be scheduled for increased fetal surveillance with twice weekly fetal testing after 32 weeks' gestation. In the absence of any complications, delivery is recommended at 39 weeks' gestation. Given the increased risk for hypertensive disorders of pregnancy,  the potential utility of a low dose aspirin in reducing her risk for hypertensive disorders of pregnancy was reviewed. The risks and benefits of low dose aspirin were discussed. She was counseled that she could take 81 mg of aspirin, daily. A prescription was previously provided. Additional maternal evaluation was recommended with a nutrition panel, thyroid function studies, and a hemoglobin A1c.    --Baseline testing was completed on 1/17/2023, the patient's results included: H/H12.9/38, MCV 96, platelet count 840,594, potassium 4.3, creatinine 0.5, calcium 9.6, ALT 8, AST 17, magnesium 1.7, ferritin 30, folate >20, vitamin B12 172, vitamin D 32, hemoglobin A1c 4.5%, TSH 1.18, free T40.96, free T32.8, TPO negative, antithyroglobulin antibody negative, , uric acid 5.2, urine culture no urinalysis negative, urine protein creatinine ratio 0.1.  --Additional recommendations are below. 3.   Migraine headaches -- The patient reported a history of migraine headaches. She reports that she was diagnosed 2 years ago. She follows with her PCP. She was using Imitrex as needed prior to pregnancy. She again denies having any issues with migraine headaches. Counseling was previously provided. Counseling was reviewed. The patient did not have any additional questions or concerns. The patient was counseled that migraine headaches can improve during pregnancy. However, in some patients symptoms are exacerbated. She was counseled regarding the use of magnesium oxide 400 mg twice daily and riboflavin 100 mg daily in reducing the frequency and intensity of migraine headaches. Precautions were reviewed, and she was counseled that if she develops the worst headache of her life or a headache with neurological deficits, to present immediately for evaluation. She expressed verbal understanding of this counseling. Because of the patient's headache symptoms, a baseline nutrition panel and thyroid function testing was recommended. -- Baseline testing completed 1/17/2023. Results summarized above. Additional recommendations are below. 4.  Low vitamin D -- The patient's vitamin D was low at 32  --Recommend vitamin D3 2000 IU daily  --Monitor levels serially  --Monitor nutrition panel q4-6 weeks (CBC, CMP, magnesium, ferritin, folate, vitamin B12, vitamin D25OH), on/after 2/28/2023    -- Repeat testing ordered  --Follow up with PCP for long term monitoring and management     5.   Vitamin B12 deficiency -- The patient's vitamin B12 was deficient at 172  --Recommend vitamin B12 1000 mcg PO daily  --Monitor levels serially  --Monitor nutrition panel q4-6 weeks (CBC, CMP, magnesium, ferritin, folate, vitamin B12, vitamin D25OH), repeat testing on/after 2/28/2023    -- Repeat testing ordered  --Follow up with PCP for long term monitoring and management      6. Elevated umbilical artery PI -- The umbilical artery PI was first noted to be elevated at 28 weeks 1 day. End-diastolic flow was seen. Overall, fetal growth was appropriate for gestational age. Testing was repeated at 30 weeks 2 days. The umbilical artery PI was again intermittently elevated. End-diastolic flow was seen. The umbilical artery PI was again elevated at 31 weeks 3 days. End-diastolic flow was seen. Fetal testing was otherwise reassuring. A biophysical profile score was 8/8, ARASELI was normal, and the MCA Doppler studies were normal.     Counseling was previously provided to the patient. Counseling was reviewed. The patient was counseled that this finding can be indicative of placental dysfunction. It also may be associated with an increased risk for poor fetal growth, hypertensive disorders of pregnancy, and stillbirth. Thus, increased fetal surveillance is recommended. The patient was counseled continue a daily low-dose aspirin. The patient was counseled to monitor fetal kick counts daily, instructions were reviewed. The patient should have a follow-up ultrasound weekly for biophysical profiles and repeat Doppler studies. Fetal growth should be reassessed in 1 week. She should start twice-weekly testing at 32 weeks gestation, weekly ultrasound and weekly nonstress test.        7. Proteinuria -- The patient was previously noted to have trace protein on a urine dip at 20 weeks 6 days. She was normotensive with a blood pressure of 132/82 and she denied any signs or symptoms of preeclampsia.  She denied any signs or symptoms of a urinary tract infection. She was given orders to have a urine culture and urine P/C ratio. If any abnormalities are detected, she will be contacted with results and follow-up recommendations. The patient's urine negative for protein today. Her blood pressure was normal at 123/73. Preeclampsia precautions were reviewed with the patient. --The patient was scheduled for nonstress tests on Tuesdays and ultrasound testing on Fridays. --I requested the patient return for a follow-up assessment in 1 week unless there is a clinical reason for her to return prior to that time. She is to call if she has any problems or questions prior to her next visit. Further evaluation and management will be dependent on her clinical presentation and the results of her testing. --The patient was advised to call if she has any increased vaginal discharge, vaginal bleeding, contractions, abdominal pain, back pain or any new significant symptomatology prior to her next visit. I advised her that these are signs and symptoms of cervical change and require follow-up assessment when they occur. Preeclampsia precautions were also reviewed with the patient. --The patient was also counseled to call and/or return with any concerns for decreased fetal activity. --The patient is to continue to follow with you in your office for ongoing obstetric care. --The total time spent on today's visit was 20 minutes. This included preparation for the visit (i.e. reviewing prior external notes and test results), performance of a medically appropriate history and examination, counseling, orders for medications, tests or other procedures, and coordination of care. Greater than 50% of the time was spent face-to-face with the patient. This time is exclusive of procedures performed. I answered all of  the patient's questions to her satisfaction. I asked her to call if she had any additional questions prior to her next visit. --At the conclusion of the visit, the patient appeared to have a good understanding of the issues discussed. I answered all of her questions to her satisfaction. I asked her to call if she had any additional questions prior to her next visit. --Thank you for allowing me to participate in the care of this pleasant patient. Please don't hesitate to call me if you have any questions. Sincerely,      Court Zambrano MD, [address] [facility]  [phone] option 555 [address], 3rd floor, [facility], [address] [place] [address]      *All or parts of this note may have been generated using a voice recognition program. There may be typo, grammar, or Word substitution errors that have escaped my review of this note.

## 2023-03-11 LAB
GLUCOSE TOLERANCE TEST 1 HOUR: 166 MG/DL
GLUCOSE TOLERANCE TEST 2 HOUR: 116 MG/DL
GLUCOSE TOLERANCE TEST FASTING: 69 MG/DL

## 2023-03-12 LAB — URINE CULTURE, ROUTINE: NORMAL

## 2023-03-13 ENCOUNTER — ROUTINE PRENATAL (OUTPATIENT)
Dept: OBGYN | Age: 23
End: 2023-03-13
Payer: COMMERCIAL

## 2023-03-13 ENCOUNTER — ROUTINE PRENATAL (OUTPATIENT)
Dept: OBGYN CLINIC | Age: 23
End: 2023-03-13
Payer: COMMERCIAL

## 2023-03-13 VITALS
DIASTOLIC BLOOD PRESSURE: 84 MMHG | SYSTOLIC BLOOD PRESSURE: 124 MMHG | HEART RATE: 85 BPM | BODY MASS INDEX: 41.59 KG/M2 | WEIGHT: 269.5 LBS

## 2023-03-13 DIAGNOSIS — R79.0 LOW FERRITIN: ICD-10-CM

## 2023-03-13 DIAGNOSIS — Z34.93 ENCOUNTER FOR SUPERVISION OF NORMAL PREGNANCY IN THIRD TRIMESTER, UNSPECIFIED GRAVIDITY: Primary | ICD-10-CM

## 2023-03-13 DIAGNOSIS — R94.8 ABNORMAL PLACENTA FUNCTION TEST: ICD-10-CM

## 2023-03-13 DIAGNOSIS — R79.89 LOW VITAMIN D LEVEL: ICD-10-CM

## 2023-03-13 DIAGNOSIS — R80.9 URINE PROTEIN INCREASED: ICD-10-CM

## 2023-03-13 DIAGNOSIS — Z34.02 ENCOUNTER FOR SUPERVISION OF NORMAL FIRST PREGNANCY, SECOND TRIMESTER: Primary | ICD-10-CM

## 2023-03-13 DIAGNOSIS — E66.01 CLASS 3 SEVERE OBESITY DUE TO EXCESS CALORIES WITHOUT SERIOUS COMORBIDITY WITH BODY MASS INDEX (BMI) OF 40.0 TO 44.9 IN ADULT (HCC): ICD-10-CM

## 2023-03-13 DIAGNOSIS — E53.8 VITAMIN B12 DEFICIENCY: ICD-10-CM

## 2023-03-13 PROBLEM — E63.9 NUTRITIONAL DEFICIENCY: Status: ACTIVE | Noted: 2023-03-13

## 2023-03-13 LAB
ANTICARDIOLIPIN IGA ANTIBODY: <10 APL
ANTICARDIOLIPIN IGG ANTIBODY: <10 GPL
BETA-2 GLYCOPROTEIN 1 IGG ANTIBODY: <10 SGU
BETA-2 GLYCOPROTEIN 1 IGM ANTIBODY: <10 SMU
CARDIOLIPIN AB IGM: <10 MPL
GLUCOSE URINE, POC: NEGATIVE
PROTEIN UA: POSITIVE

## 2023-03-13 PROCEDURE — 81002 URINALYSIS NONAUTO W/O SCOPE: CPT | Performed by: MIDWIFE

## 2023-03-13 PROCEDURE — 59025 FETAL NON-STRESS TEST: CPT | Performed by: OBSTETRICS & GYNECOLOGY

## 2023-03-13 PROCEDURE — 90715 TDAP VACCINE 7 YRS/> IM: CPT | Performed by: MIDWIFE

## 2023-03-13 PROCEDURE — 99212 OFFICE O/P EST SF 10 MIN: CPT | Performed by: OBSTETRICS & GYNECOLOGY

## 2023-03-13 PROCEDURE — 0502F SUBSEQUENT PRENATAL CARE: CPT | Performed by: MIDWIFE

## 2023-03-13 PROCEDURE — 99213 OFFICE O/P EST LOW 20 MIN: CPT | Performed by: OBSTETRICS & GYNECOLOGY

## 2023-03-13 PROCEDURE — 99213 OFFICE O/P EST LOW 20 MIN: CPT | Performed by: MIDWIFE

## 2023-03-13 RX ORDER — FERROUS SULFATE 325(65) MG
325 TABLET ORAL 2 TIMES DAILY
Qty: 60 TABLET | Refills: 1 | Status: SHIPPED | OUTPATIENT
Start: 2023-03-13

## 2023-03-13 NOTE — PROGRESS NOTES
, return OB at 31w6d weeks    Patient Active Problem List   Diagnosis    Epistaxis    Encounter for supervision of normal first pregnancy, second trimester    Class 3 severe obesity due to excess calories without serious comorbidity with body mass index (BMI) of 40.0 to 44.9 in adult [facility])    Low vitamin D level    Vitamin B12 deficiency    Abnormal placenta function test    Urine protein increased    Nutritional deficiency        Subjective:  doing well    Bleeding no   Leaking of fluid no   Painful cramping/contractions no   Headache no   Epigastric pain no   Edema yes - bilat ankles. Discussed increasing fluids, putting feet up, soaking in tub, not limiting salt intake     Fetal movement good, patient reports 10 movements in 2 hours    Objective:  See flowsheet    Vitals:    23 1005   BP: 124/84   Pulse: 85       FH 32  FHT per flowshee    1 hour GTT and CBC was done and result was 156.  2 hour WNL  TDAP:  today    Assessment:   at 31w6d   Blood pressure WNL  Size equals dates  Total weight gain appropriate   rhogam:  Not eligible    ICD-10-CM    1. Encounter for supervision of normal pregnancy in third trimester, unspecified   Z34.93 POCT urine qual dipstick glucose     POCT urine qual dipstick protein           Plan:    RTO 2 weeks  See MFM per their recommendations, she is currently having NSTs twice weekly on  and     Orders Placed This Encounter   Procedures    Adrianap, SHERRIE, (age 10y-63y), IM    POCT urine qual dipstick glucose    POCT urine qual dipstick protein      Fetal movement:  Baby should move 10 times every 2 hours. If movements decrease below 10 in 2 hours, or decrease from what is typical for that pregnancy, patient should eat something, drink something, and lay down to count movements. If she does not feel 10 movements after doing these things, she is to immediately proceed to L&D for NST. She should NOT WAIT until the next day.      labor precautions discussed with patient. Patient should report >4 contractions/tightenings in 1 hour after first emptying bladder, laying down, drinking 2 large glasses of water. Should also promptly report any vaginal bleeding, menstrual-type cramping, dysuria, urgency or frequency.

## 2023-03-13 NOTE — PROGRESS NOTES
2023       RE:  Sony Moss  : 2000   AGE: 25 y.o. This report has been created using voice recognition software. It may contain errors which are inherent in voice recognition technology. Dear Dr. Severiano Flax:       I had the pleasure of meeting with Ms. Beth Peters for fetal testing. As you know, Ms. Beth Peters  is a 25 y.o.  at 32w8d ([address] [place] [address]) who is being followed by our office for Doppler studies. Today, Ms. Beth Peters reports that she feels well. She notes good fetal movement and denies any symptoms of leaking of fluid, vaginal bleeding, and/or contractions. She presents today for fetal testing. A nonstress test was reactive. PERTINENT PHYSICAL EXAMINATION:   LMP 2022     Urine dipstick:   Negative for Glucose    Trace for Albumin      A fetal ultrasound assessment was performed today. A report is enclosed for your review. Assessment & Plan:  25 y.o.  at 32w8d ([address] [place] [address]) with:    1. Fetal testing -- The patient presented to the office today for fetal testing secondary to abnormal Doppler studies. The patient did not have any concerns today. Fetal testing was reassuring and the nonstress test was reactive. 2.  Lab review -- The patient's lab results from 3/10/23 were reviewed. Results included: H/H 13.2/39.3, MCV 93.3, plate count 004,902, potassium 4.5 creatinine 0.5, calcium 9.1, ALT 10, AST 21, magnesium 1.8, ferritin 16, folate 19.1, B12 320, vitamin D3 34, hemoglobin A1c 5.3%, TSH 0.905, FT4 1.08, uric acid 5.2, , MONICA negative,  B2GP negative, LAC negative, UA contaminated, urine culture mixed, urine P/C 0.1, 2 hour OGTT 69/166/116.   -- She was consulted continue her B12 and vitamin D    3.   Low ferritin -- The patient's ferritin was low at 16 (considered low if <15 in absence of anemia or <40 in setting of anemia)  --Ferrous sulfate 325 mg BID prescribed  --Monitor levels serially  --Monitor nutrition panel q4-6 weeks (CBC, CMP, magnesium,

## 2023-03-13 NOTE — PATIENT INSTRUCTIONS
Weeks 30 to 32 of Your Pregnancy: Care Instructions  Your baby is starting to look like a baby, with hair and plump skin. Your baby may sleep 20 to 45 minutes at a time and is more active at certain times of day. You should feel your baby move several times every day. Your baby now turns less and kicks more. This is a good time to tour your hospital or birthing center. You may also want to find childcare if needed. To ease heartburn   Avoid foods that make your symptoms worse, such as chocolate, spicy foods, and caffeine. Avoid bending over or lying down after meals. Do not eat for 2 hours before bedtime. Take antacids like Tums, but don't take ones that have sodium bicarbonate. To care for large, swollen veins (varicose veins)   Avoid standing for long periods of time. Sit with your feet propped up. Wear support hose. Get some exercise every day, like walking or swimming. Counting your baby's kicks  Your doctor may ask you to count your baby's movements, such as kicks, flutters, or rolls. Find a quiet place, and get comfortable. Write down your start time. Count your baby's movements (except hiccups). When your baby has moved 10 times, you can stop counting. Write down how many minutes it took. If an hour goes by and you don't feel 10 movements, have something to eat or drink. Count for another hour. If you don't feel at least 10 movements in the 2-hour period, call your doctor. Follow-up care is a key part of your treatment and safety. Be sure to make and go to all appointments, and call your doctor if you are having problems. It's also a good idea to know your test results and keep a list of the medicines you take. Where can you learn more? Go to http://www.woods.com/ and enter X471 to learn more about \"Weeks 30 to 32 of Your Pregnancy: Care Instructions. \"  Current as of: February 23, 2022               Content Version: 13.5  © 2628-8990 [employer].

## 2023-03-13 NOTE — PROGRESS NOTES
Patient alert and pleasant with no complaints. Here today for prenatal visit. Fetal heart tones obtained without difficulty. Urine for glucose negative and protein obtained with trace results. TDAP given left deltoid. Tolerated well. Discharge instructions have been discussed with the patient. Patient advised to call our office with any questions or concerns. Voiced understanding.

## 2023-03-13 NOTE — PROGRESS NOTES
NON STRESS TEST INTERPRETATION    3/13/23    RE:  Langston Boas   : 2000   AGE: 25 y.o.     GESTATIONAL AGE:  31w6d    DIAGNOSIS:  BMI 40, elevated umbilical artery Dopplers    INDICATION:  Same as above    TIME ON:  1222      TIME OFF:  1249      RESULT:   REACTIVE      FHR Baseline Rate:   135 bpm    PERIODIC CHANGES:    Accelerations present, variability moderate  Uterine irritability noted    COMMENTS:      She is to continue having NST's every 3-4 days, and BPP with umbilical artery doppler studies once per week        Arlyn Eugene MD, Hodan Tate

## 2023-03-15 ENCOUNTER — ANCILLARY PROCEDURE (OUTPATIENT)
Dept: OBGYN CLINIC | Age: 23
End: 2023-03-15
Payer: COMMERCIAL

## 2023-03-15 ENCOUNTER — ROUTINE PRENATAL (OUTPATIENT)
Dept: OBGYN CLINIC | Age: 23
End: 2023-03-15
Payer: COMMERCIAL

## 2023-03-15 VITALS
HEART RATE: 98 BPM | SYSTOLIC BLOOD PRESSURE: 132 MMHG | WEIGHT: 272 LBS | DIASTOLIC BLOOD PRESSURE: 84 MMHG | BODY MASS INDEX: 41.97 KG/M2

## 2023-03-15 DIAGNOSIS — Z34.03 PRIMIGRAVIDA IN THIRD TRIMESTER: ICD-10-CM

## 2023-03-15 DIAGNOSIS — R80.9 URINE PROTEIN INCREASED: ICD-10-CM

## 2023-03-15 DIAGNOSIS — E53.8 VITAMIN B12 DEFICIENCY: ICD-10-CM

## 2023-03-15 DIAGNOSIS — Z34.02 ENCOUNTER FOR SUPERVISION OF NORMAL FIRST PREGNANCY, SECOND TRIMESTER: Primary | ICD-10-CM

## 2023-03-15 DIAGNOSIS — R94.8 ABNORMAL PLACENTA FUNCTION TEST: ICD-10-CM

## 2023-03-15 DIAGNOSIS — Z34.02 ENCOUNTER FOR SUPERVISION OF NORMAL FIRST PREGNANCY, SECOND TRIMESTER: ICD-10-CM

## 2023-03-15 DIAGNOSIS — Z3A.32 32 WEEKS GESTATION OF PREGNANCY: ICD-10-CM

## 2023-03-15 DIAGNOSIS — E66.01 CLASS 3 SEVERE OBESITY DUE TO EXCESS CALORIES WITHOUT SERIOUS COMORBIDITY WITH BODY MASS INDEX (BMI) OF 40.0 TO 44.9 IN ADULT (HCC): ICD-10-CM

## 2023-03-15 DIAGNOSIS — R79.0 LOW FERRITIN: ICD-10-CM

## 2023-03-15 DIAGNOSIS — E63.9 NUTRITIONAL DEFICIENCY: ICD-10-CM

## 2023-03-15 LAB
GLUCOSE URINE, POC: NEGATIVE
PROTEIN UA: NEGATIVE

## 2023-03-15 PROCEDURE — 99214 OFFICE O/P EST MOD 30 MIN: CPT | Performed by: OBSTETRICS & GYNECOLOGY

## 2023-03-15 PROCEDURE — 81002 URINALYSIS NONAUTO W/O SCOPE: CPT | Performed by: OBSTETRICS & GYNECOLOGY

## 2023-03-15 PROCEDURE — 76816 OB US FOLLOW-UP PER FETUS: CPT | Performed by: OBSTETRICS & GYNECOLOGY

## 2023-03-15 PROCEDURE — 76820 UMBILICAL ARTERY ECHO: CPT | Performed by: OBSTETRICS & GYNECOLOGY

## 2023-03-15 PROCEDURE — 93976 VASCULAR STUDY: CPT | Performed by: OBSTETRICS & GYNECOLOGY

## 2023-03-15 PROCEDURE — 76818 FETAL BIOPHYS PROFILE W/NST: CPT | Performed by: OBSTETRICS & GYNECOLOGY

## 2023-03-15 PROCEDURE — 99213 OFFICE O/P EST LOW 20 MIN: CPT | Performed by: OBSTETRICS & GYNECOLOGY

## 2023-03-15 PROCEDURE — 76821 MIDDLE CEREBRAL ARTERY ECHO: CPT | Performed by: OBSTETRICS & GYNECOLOGY

## 2023-03-15 NOTE — PATIENT INSTRUCTIONS

## 2023-03-15 NOTE — PROGRESS NOTES
Pt here for follow up growth  Pt denies any bleeding/cramping  Pt states good fetal movement
if you have any questions. Sincerely,      Meaghan Hannah MD, [address] [facility]  [phone] option 555 [address], 3rd floor, [facility], [address] [place] [address]      *All or parts of this note may have been generated using a voice recognition program. There may be typo, grammar, or Word substitution errors that have escaped my review of this note.

## 2023-03-21 ENCOUNTER — ANCILLARY PROCEDURE (OUTPATIENT)
Dept: OBGYN CLINIC | Age: 23
End: 2023-03-21
Payer: COMMERCIAL

## 2023-03-21 ENCOUNTER — ROUTINE PRENATAL (OUTPATIENT)
Dept: OBGYN CLINIC | Age: 23
End: 2023-03-21
Payer: COMMERCIAL

## 2023-03-21 VITALS
BODY MASS INDEX: 42.53 KG/M2 | DIASTOLIC BLOOD PRESSURE: 81 MMHG | HEART RATE: 97 BPM | SYSTOLIC BLOOD PRESSURE: 125 MMHG | WEIGHT: 271 LBS | HEIGHT: 67 IN

## 2023-03-21 DIAGNOSIS — Z3A.33 33 WEEKS GESTATION OF PREGNANCY: Primary | ICD-10-CM

## 2023-03-21 DIAGNOSIS — E63.9 NUTRITIONAL DEFICIENCY: ICD-10-CM

## 2023-03-21 DIAGNOSIS — R79.0 LOW FERRITIN: ICD-10-CM

## 2023-03-21 DIAGNOSIS — R94.8 ABNORMAL PLACENTA FUNCTION TEST: ICD-10-CM

## 2023-03-21 LAB
GLUCOSE URINE, POC: NEGATIVE
PROTEIN UA: POSITIVE

## 2023-03-21 PROCEDURE — 81002 URINALYSIS NONAUTO W/O SCOPE: CPT | Performed by: OBSTETRICS & GYNECOLOGY

## 2023-03-21 PROCEDURE — 76815 OB US LIMITED FETUS(S): CPT | Performed by: OBSTETRICS & GYNECOLOGY

## 2023-03-21 PROCEDURE — 76821 MIDDLE CEREBRAL ARTERY ECHO: CPT | Performed by: OBSTETRICS & GYNECOLOGY

## 2023-03-21 PROCEDURE — 99212 OFFICE O/P EST SF 10 MIN: CPT | Performed by: OBSTETRICS & GYNECOLOGY

## 2023-03-21 PROCEDURE — 76820 UMBILICAL ARTERY ECHO: CPT | Performed by: OBSTETRICS & GYNECOLOGY

## 2023-03-21 PROCEDURE — 76818 FETAL BIOPHYS PROFILE W/NST: CPT | Performed by: OBSTETRICS & GYNECOLOGY

## 2023-03-21 PROCEDURE — 99213 OFFICE O/P EST LOW 20 MIN: CPT | Performed by: OBSTETRICS & GYNECOLOGY

## 2023-03-21 NOTE — PROGRESS NOTES
Patient here for NST only  No complaints  +FM noted
was provided. -- The patient declined genetic screening     The patient's prenatal record was reviewed. She had screening for cystic fibrosis that was negative. She was also counseled regarding the recommendations for screening for spinal muscular atrophy and Fragile X. The risks and benefits of screening were reviewed. After this discussion, she was undecided. She was provided a handout regarding a [employer] panel. -- The patient declined carrier screening        2. Obesity in pregnancy -- The patient reports that she is 5 foot 8 inches tall. She reports that her weight has remained stable. She was noted to weigh 260 pounds at 19 weeks 6 days. She weighed 271 pounds today. She has lost 1 pound since her last visit. Her BMI is 42.44. Given the increased risks previously described, additional testing is recommended. Fetal growth should be monitored every 3-4 weeks starting at 24-26 weeks' gestation. --Fetal growth was appropriate for the gestational age at 31 weeks 2 day. The umbilical artery PI was intermittently elevated. See below. -- Fetal growth was appropriate for gestational age at 26 weeks 1 day. The umbilical l artery PI is elevated. See below. The patient should monitor fetal kick counts daily, starting at 28 weeks' gestation. Instructions were reviewed. She should be scheduled for increased fetal surveillance with twice weekly fetal testing after 32 weeks' gestation. In the absence of any complications, delivery is recommended at 39 weeks' gestation. Given the increased risk for hypertensive disorders of pregnancy,  the potential utility of a low dose aspirin in reducing her risk for hypertensive disorders of pregnancy was reviewed. The risks and benefits of low dose aspirin were discussed. She was counseled that she could take 81 mg of aspirin, daily. A prescription was previously provided.      Additional maternal evaluation was recommended with a nutrition panel,

## 2023-03-21 NOTE — PATIENT INSTRUCTIONS

## 2023-03-21 NOTE — LETTER
counseled to call and/or return with any concerns for decreased fetal activity. --The patient is to continue to follow with you in your office for ongoing obstetric care. --The total time spent on today's visit was 15 minutes. This included preparation for the visit (i.e. reviewing prior external notes and test results), performance of a medically appropriate history and examination, counseling, orders for medications, tests or other procedures, and coordination of care. Greater than 50% of the time was spent face-to-face with the patient. This time is exclusive of procedures performed. I answered all of  the patient's questions to her satisfaction. I asked her to call if she had any additional questions prior to her next visit. --At the conclusion of the visit, the patient appeared to have a good understanding of the issues discussed. I answered all of her questions to her satisfaction. I asked her to call if she had any additional questions prior to her next visit. --Thank you for allowing me to participate in the care of this pleasant patient. Please don't hesitate to call me if you have any questions. Sincerely,      Elizabeth Granger MD, [address] [facility]  [phone] option 555 [address], 3rd floor, [facility], [address] [place] [address]      *All or parts of this note may have been generated using a voice recognition program. There may be typo, grammar, or Word substitution errors that have escaped my review of this note.

## 2023-03-23 ENCOUNTER — HOSPITAL ENCOUNTER (OUTPATIENT)
Age: 23
Discharge: HOME | End: 2023-03-24
Attending: OBSTETRICS & GYNECOLOGY | Admitting: OBSTETRICS & GYNECOLOGY
Payer: COMMERCIAL

## 2023-03-23 ENCOUNTER — ROUTINE PRENATAL (OUTPATIENT)
Dept: OBGYN CLINIC | Age: 23
End: 2023-03-23
Payer: COMMERCIAL

## 2023-03-23 ENCOUNTER — ANCILLARY PROCEDURE (OUTPATIENT)
Dept: OBGYN CLINIC | Age: 23
End: 2023-03-23
Payer: COMMERCIAL

## 2023-03-23 VITALS
BODY MASS INDEX: 42.6 KG/M2 | SYSTOLIC BLOOD PRESSURE: 121 MMHG | WEIGHT: 272 LBS | DIASTOLIC BLOOD PRESSURE: 81 MMHG | HEART RATE: 83 BPM

## 2023-03-23 DIAGNOSIS — E66.01 CLASS 3 SEVERE OBESITY DUE TO EXCESS CALORIES WITHOUT SERIOUS COMORBIDITY WITH BODY MASS INDEX (BMI) OF 40.0 TO 44.9 IN ADULT (HCC): ICD-10-CM

## 2023-03-23 DIAGNOSIS — Z3A.33 33 WEEKS GESTATION OF PREGNANCY: Primary | ICD-10-CM

## 2023-03-23 DIAGNOSIS — R79.89 LOW VITAMIN D LEVEL: ICD-10-CM

## 2023-03-23 DIAGNOSIS — R79.0 LOW FERRITIN: ICD-10-CM

## 2023-03-23 DIAGNOSIS — R94.8 ABNORMAL PLACENTA FUNCTION TEST: ICD-10-CM

## 2023-03-23 DIAGNOSIS — R80.9 URINE PROTEIN INCREASED: ICD-10-CM

## 2023-03-23 DIAGNOSIS — E53.8 VITAMIN B12 DEFICIENCY: ICD-10-CM

## 2023-03-23 DIAGNOSIS — O47.00 PRETERM UTERINE CONTRACTIONS: ICD-10-CM

## 2023-03-23 DIAGNOSIS — E63.9 NUTRITIONAL DEFICIENCY: ICD-10-CM

## 2023-03-23 LAB
GLUCOSE URINE, POC: NORMAL
PROTEIN UA: NEGATIVE

## 2023-03-23 PROCEDURE — 76818 FETAL BIOPHYS PROFILE W/NST: CPT | Performed by: OBSTETRICS & GYNECOLOGY

## 2023-03-23 PROCEDURE — 76820 UMBILICAL ARTERY ECHO: CPT | Performed by: OBSTETRICS & GYNECOLOGY

## 2023-03-23 PROCEDURE — 99213 OFFICE O/P EST LOW 20 MIN: CPT | Performed by: OBSTETRICS & GYNECOLOGY

## 2023-03-23 PROCEDURE — 81002 URINALYSIS NONAUTO W/O SCOPE: CPT | Performed by: OBSTETRICS & GYNECOLOGY

## 2023-03-23 PROCEDURE — 2580000003 HC RX 258: Performed by: OBSTETRICS & GYNECOLOGY

## 2023-03-23 PROCEDURE — 76821 MIDDLE CEREBRAL ARTERY ECHO: CPT | Performed by: OBSTETRICS & GYNECOLOGY

## 2023-03-23 PROCEDURE — 76815 OB US LIMITED FETUS(S): CPT | Performed by: OBSTETRICS & GYNECOLOGY

## 2023-03-23 RX ORDER — SODIUM CHLORIDE, SODIUM LACTATE, POTASSIUM CHLORIDE, CALCIUM CHLORIDE 600; 310; 30; 20 MG/100ML; MG/100ML; MG/100ML; MG/100ML
INJECTION, SOLUTION INTRAVENOUS CONTINUOUS
Status: DISCONTINUED | OUTPATIENT
Start: 2023-03-23 | End: 2023-03-24 | Stop reason: HOSPADM

## 2023-03-23 RX ORDER — SODIUM CHLORIDE, SODIUM LACTATE, POTASSIUM CHLORIDE, AND CALCIUM CHLORIDE .6; .31; .03; .02 G/100ML; G/100ML; G/100ML; G/100ML
500 INJECTION, SOLUTION INTRAVENOUS ONCE
Status: COMPLETED | OUTPATIENT
Start: 2023-03-23 | End: 2023-03-23

## 2023-03-23 RX ADMIN — SODIUM CHLORIDE, POTASSIUM CHLORIDE, SODIUM LACTATE AND CALCIUM CHLORIDE 500 ML: 600; 310; 30; 20 INJECTION, SOLUTION INTRAVENOUS at 23:00

## 2023-03-23 RX ADMIN — SODIUM CHLORIDE, POTASSIUM CHLORIDE, SODIUM LACTATE AND CALCIUM CHLORIDE: 600; 310; 30; 20 INJECTION, SOLUTION INTRAVENOUS at 23:31

## 2023-03-23 NOTE — H&P
Department of Obstetrics and Gynecology  Attending Obstetrics History and Physical      HISTORY OF PRESENT ILLNESS:      The patient is a 25 y.o.  1 parity 0 at 33 weeks 2 days. Sent from [facility] for prolonged monitoring secondary to ctx's and intermittent variables on nst. Patient denies ctx's. No urinary symptoms, diarrhea, emesis or fever. Estimated Due Date:  23  Contractions:  Yes  Leaking of fluid: no  nfm  Blleeding:  No    PRENATAL CARE:    Complications: No      Active Problems:    * No active hospital problems. *  Resolved Problems:    * No resolved hospital problems. *        PAST OB HISTORY    OB History    Para Term  AB Living   1             SAB IAB Ectopic Molar Multiple Live Births                    # Outcome Date GA Lbr Naveed/2nd Weight Sex Delivery Anes PTL Lv   1 Current                    Pre-eclampsia:  No      :  No      D & C:  No      Cerclage:  No      LEEP:  No      Myomectomy:  No       Labor: No    Past Medical History:    Past Medical History:   Diagnosis Date    Migraine     Migraines     takes sumitriptan when not pregnant        Past Surgical History:    No past surgical history on file. Past Family History:  Family History   Problem Relation Age of Onset    Cancer Paternal Grandfather     Other Paternal Grandfather         MI    Diabetes Paternal Grandmother     Hypertension Maternal Grandmother     Hypertension Maternal Grandfather     Other Father 44        tremors, MI age 36    Hypertension Mother        ROS:  Const: No fever, chills, night sweats, no recent unexplained weight gain/loss  HEENT: No blurred vision, double vision; no ear problems; no sore throat, congestion; no running nose. Resp: No cough, no sputum, no pleuritic chest pain, no sob  Cardio: No chest pain, no exertional dyspnea, no PND, no orthopnea, no palpitation, no leg swelling. GI: No dysphagia, no reflux; no abdominal pain, no n/v; no c/d.  No hematochezia    : No on nst at [facility]  Denies feeling ctx's.  Some intermittent ctx's  Cx closed/thick/high/firm         Plan: monitor, po hydration          Electronically signed by Eleazar Mustafa MD on 3/23/2023 at 6:34 PM

## 2023-03-23 NOTE — LETTER
March 23, 2023      Rose Marie Lance, LATANYA - CNP  [id number]      Patient: Mathieu Landis   MR Number: [id number]   YOB: 2000   Date of Visit: 3/23/2023       Dear oRse Marie Lance: Thank you for referring Mathieu Landis to me for evaluation/treatment. Below are the relevant portions of my assessment and plan of care. If you have questions, please do not hesitate to call me. I look forward to following Shelly along with you.     Sincerely,        Lisa Landaverde MD
recommended at 39 weeks' gestation. Given the increased risk for hypertensive disorders of pregnancy,  the potential utility of a low dose aspirin in reducing her risk for hypertensive disorders of pregnancy was reviewed. The risks and benefits of low dose aspirin were discussed. She was counseled that she could take 81 mg of aspirin, daily. A prescription was previously provided. Additional maternal evaluation was recommended with a nutrition panel, thyroid function studies, and a hemoglobin A1c. --Baseline testing was completed on 1/17/2023, the patient's results included: H/H12.9/38, MCV 96, platelet count 008,647, potassium 4.3, creatinine 0.5, calcium 9.6, ALT 8, AST 17, magnesium 1.7, ferritin 30, folate >20, vitamin B12 172, vitamin D 32, hemoglobin A1c 4.5%, TSH 1.18, free T40.96, free T32.8, TPO negative, antithyroglobulin antibody negative, , uric acid 5.2, urine culture no urinalysis negative, urine protein creatinine ratio 0.1.  --Additional recommendations are below. 3.   Migraine headaches -- The patient reported a history of migraine headaches. She reports that she was diagnosed 2 years ago. She follows with her PCP. She was using Imitrex as needed prior to pregnancy. She again denies having any issues with migraine headaches. Counseling was previously provided. Counseling was reviewed. The patient did not have any additional questions or concerns. The patient was counseled that migraine headaches can improve during pregnancy. However, in some patients symptoms are exacerbated. She was counseled regarding the use of magnesium oxide 400 mg twice daily and riboflavin 100 mg daily in reducing the frequency and intensity of migraine headaches. Precautions were again reviewed, and she was counseled that if she develops the worst headache of her life or a headache with neurological deficits, to present immediately for evaluation.   She expressed verbal

## 2023-03-23 NOTE — PROGRESS NOTES
MFM Note    The patient is a 24 yo G1PO at 33w2d (7 wk US) who is followed by [facility] for abnormal placental function testing. The patient presented to the office today for a follow-up visit. An ultrasound was completed. There was a single intrauterine gestation in a cephalic presentation with a heart rate of 140 bpm.  The placenta is anterior. The ARASELI was normal at 15.4 cm. The BPP was 8/10 with points deducted for fetal breathing. The umbilical artery PI was elevated. End-diastolic flow was seen. The MCA PSV was normal.  The MCA PI is normal.  CPR PI normal.    The initial portion of the NST had minimal variability with intermittent variables and contractions. The FHR tracing did become reactive. However, there continued to be contractions and intermittent variable decelerations. The recommendation was made for the patient to go to antepartum for additional evaluation. Recommendations:  Prolonged monitoring  Rule out  labor  Fluid hydration    Antepartum was contacted regarding the patient. Dr. Jason Siu was contacted regarding patient. Please call with any additional questions or concerns.
Patient is here today for f/u. Denies any vaginal bleeding, cramping, or leakage of fluids. Patient reports good fetal movement.
her next visit. Further evaluation and management will be dependent on her clinical presentation and the results of her testing. --The patient was advised to call if she has any increased vaginal discharge, vaginal bleeding, contractions, abdominal pain, back pain or any new significant symptomatology prior to her next visit. I advised her that these are signs and symptoms of cervical change and require follow-up assessment when they occur. Preeclampsia precautions were also reviewed with the patient. --The patient was also counseled to call and/or return with any concerns for decreased fetal activity. --The patient is to continue to follow with you in your office for ongoing obstetric care. --The total time spent on today's visit was 30 minutes. This included preparation for the visit (i.e. reviewing prior external notes and test results), performance of a medically appropriate history and examination, counseling, orders for medications, tests or other procedures, and coordination of care. Greater than 50% of the time was spent face-to-face with the patient. This time is exclusive of procedures performed. I answered all of  the patient's questions to her satisfaction. I asked her to call if she had any additional questions prior to her next visit. --At the conclusion of the visit, the patient appeared to have a good understanding of the issues discussed. I answered all of her questions to her satisfaction. I asked her to call if she had any additional questions prior to her next visit. --Thank you for allowing me to participate in the care of this pleasant patient. Please don't hesitate to call me if you have any questions.       Sincerely,      Serena Latham MD, [address] [facility]  [phone] option 555 [address], 3rd floor, [facility], [place], [address] [place] [address]      *All or parts of this note may have been generated using a voice recognition program.

## 2023-03-24 ENCOUNTER — ANCILLARY PROCEDURE (OUTPATIENT)
Dept: OBGYN CLINIC | Age: 23
End: 2023-03-24
Payer: COMMERCIAL

## 2023-03-24 VITALS
DIASTOLIC BLOOD PRESSURE: 79 MMHG | SYSTOLIC BLOOD PRESSURE: 135 MMHG | HEART RATE: 85 BPM | RESPIRATION RATE: 16 BRPM | TEMPERATURE: 97.7 F

## 2023-03-24 PROBLEM — Z3A.33 33 WEEKS GESTATION OF PREGNANCY: Status: ACTIVE | Noted: 2023-03-24

## 2023-03-24 PROCEDURE — 6360000002 HC RX W HCPCS: Performed by: OBSTETRICS & GYNECOLOGY

## 2023-03-24 PROCEDURE — 76821 MIDDLE CEREBRAL ARTERY ECHO: CPT | Performed by: OBSTETRICS & GYNECOLOGY

## 2023-03-24 PROCEDURE — 76815 OB US LIMITED FETUS(S): CPT | Performed by: OBSTETRICS & GYNECOLOGY

## 2023-03-24 PROCEDURE — 76820 UMBILICAL ARTERY ECHO: CPT | Performed by: OBSTETRICS & GYNECOLOGY

## 2023-03-24 PROCEDURE — 99211 OFF/OP EST MAY X REQ PHY/QHP: CPT

## 2023-03-24 PROCEDURE — 76817 TRANSVAGINAL US OBSTETRIC: CPT | Performed by: OBSTETRICS & GYNECOLOGY

## 2023-03-24 PROCEDURE — 36415 COLL VENOUS BLD VENIPUNCTURE: CPT

## 2023-03-24 PROCEDURE — 99213 OFFICE O/P EST LOW 20 MIN: CPT | Performed by: OBSTETRICS & GYNECOLOGY

## 2023-03-24 PROCEDURE — 76819 FETAL BIOPHYS PROFIL W/O NST: CPT | Performed by: OBSTETRICS & GYNECOLOGY

## 2023-03-24 PROCEDURE — 96372 THER/PROPH/DIAG INJ SC/IM: CPT

## 2023-03-24 PROCEDURE — 85613 RUSSELL VIPER VENOM DILUTED: CPT

## 2023-03-24 RX ORDER — BETAMETHASONE SODIUM PHOSPHATE AND BETAMETHASONE ACETATE 3; 3 MG/ML; MG/ML
12 INJECTION, SUSPENSION INTRA-ARTICULAR; INTRALESIONAL; INTRAMUSCULAR; SOFT TISSUE ONCE
Status: COMPLETED | OUTPATIENT
Start: 2023-03-24 | End: 2023-03-24

## 2023-03-24 RX ORDER — BETAMETHASONE SODIUM PHOSPHATE AND BETAMETHASONE ACETATE 3; 3 MG/ML; MG/ML
12 INJECTION, SUSPENSION INTRA-ARTICULAR; INTRALESIONAL; INTRAMUSCULAR; SOFT TISSUE ONCE
Status: DISCONTINUED | OUTPATIENT
Start: 2023-03-25 | End: 2023-03-24

## 2023-03-24 RX ADMIN — BETAMETHASONE SODIUM PHOSPHATE AND BETAMETHASONE ACETATE 12 MG: 3; 3 INJECTION, SUSPENSION INTRA-ARTICULAR; INTRALESIONAL; INTRAMUSCULAR at 00:55

## 2023-03-24 RX ADMIN — BETAMETHASONE SODIUM PHOSPHATE AND BETAMETHASONE ACETATE 12 MG: 3; 3 INJECTION, SUSPENSION INTRA-ARTICULAR; INTRALESIONAL; INTRAMUSCULAR at 19:01

## 2023-03-24 NOTE — PROGRESS NOTES
Assumed care of patient. Patient denies any LOF, VB, or any contractions you can feel. Patient states +FM. All questions answered at this time. Plan to see MFM today.

## 2023-03-24 NOTE — CONSULTS
No referring provider defined for this encounter. RE:  Thony Ladd  : 2000   AGE: 25 y.o. Dear Dr. Kayla Muller ref. provider found:    I saw your patient Yelitza Villanueva today for the following indications:    Patient Active Problem List   Diagnosis    Epistaxis    Encounter for supervision of normal first pregnancy, second trimester    Class 3 severe obesity due to excess calories without serious comorbidity with body mass index (BMI) of 40.0 to 44.9 in adult [facility])    Low vitamin D level    Vitamin B12 deficiency    Abnormal placenta function test    Urine protein increased    Nutritional deficiency    33 weeks gestation of pregnancy       As you know, Ms. Tiana Hernandez is a 25 y.o.  at 27w4d  (***) who is admitted with  ***. A Maternal-Fetal Medicine consult was requested by No ref. provider found to address these issue(s). OB History    Para Term  AB Living   1             SAB IAB Ectopic Molar Multiple Live Births                    # Outcome Date GA Lbr Naveed/2nd Weight Sex Delivery Anes PTL Lv   1 Current                PAST OBSTETRICAL HISTORY:      PAST GYNECOLOGICAL  HISTORY:  {Pos/neg/fixed:01390} for abnormal pap smears. ***  {Pos/neg/fixed:66096} for sexually transmitted diseases. ***  {Pos/neg/fixed:76090} for cervical LEEP / conization /cryosurgery. ***   {Pos/neg/fixed:12213} for uterine surgery. ***  {Pos/neg/fixed:05778} for ovarian or tubal surgery.  ***    PAST MEDICAL HISTORY:     MEDICATIONS:  ***    ILLNESSES:  ***    BLOOD TRANSFUSIONS:  ***    IMMUNIZATIONS:   Up-to-date    SURGERIES:  ***    HOSPITALIZATIONS:  ***    ALLERGIES:  ***    SOCIAL HISTORY:  ***      FAMILY HISTORY:  CANCER: ***  CAD: ***  CVA: ***  CONGENITAL ANOMALIES: ***  DIABETES: ***  DVT: ***  BLEEDING DISORDERS: ***  AUTOIMMUNE DISORDERS: ***      Past Medical History:   Diagnosis Date    Migraine     Migraines     takes sumitriptan when not pregnant       No past surgical history on Dr. Kasia Zuñiga to address these issue(s). PAST OBSTETRICAL HISTORY:  None     PAST GYNECOLOGICAL  HISTORY:  Negative for abnormal pap smears. Negative for sexually transmitted diseases. Negative for cervical LEEP / conization /cryosurgery. Negative for uterine surgery. Negative for ovarian or tubal surgery. PAST MEDICAL HISTORY:     MEDICATIONS:  Prenatal vitamin  Saline nasal spray     ILLNESSES:  Migraine headaches     BLOOD TRANSFUSIONS:  None     IMMUNIZATIONS:  Up-to-date, no flu vaccine, no COVID vaccine     SURGERIES:  None      HOSPITALIZATIONS:  None     ALLERGIES:  Penicillin -- hives  Amoxicillin -- hives  She denies any latex or shellfish allergies. SOCIAL HISTORY:  She denies any tobacco, alcohol, or drug use  She is not currently working. FAMILY MEDICAL HISTORY:   Negative for congenital abnormalities, autism, genetic disease and mental retardation, not listed above. Cancer PGF -- prostate cancer  CAD PGF -- MI; Father -- MI/CAD;  Mother -- CHTN; MGM -- CHTN; MGF -- CHTN  CVA None  Congenital anomalies None  DM PGM  DVT None   Bleeding disorders None  Autoimmune disorders None    PAST MEDICAL HISTORY:           Current Facility-Administered Medications:     [COMPLETED] betamethasone acetate-betamethasone sodium phosphate (CELESTONE) injection 12 mg, 12 mg, IntraMUSCular, Once, 12 mg at 03/24/23 0055 **FOLLOWED BY** [START ON 3/25/2023] betamethasone acetate-betamethasone sodium phosphate (CELESTONE) injection 12 mg, 12 mg, IntraMUSCular, Once, Westley Li MD    [COMPLETED] lactated ringers bolus, 500 mL, IntraVENous, Once, Stopped at 03/23/23 2331 **FOLLOWED BY** lactated ringers IV soln infusion, , IntraVENous, Continuous, Westley Li MD, Last Rate: 125 mL/hr at 03/23/23 2331, New Bag at 03/23/23 2331        PERTINENT PHYSICAL EXAMINATION:   Patient Vitals for the past 24 hrs:   BP Temp Temp src Pulse Resp   03/24/23 0815 135/79 97.7 °F (36.5 °C) Oral 85 16   23 0542 130/75 98.8 °F (37.1 °C) Oral 62 16   23 0231 (!) 123/57 98.9 °F (37.2 °C) Oral 77 17   23 1945 126/72 97.9 °F (36.6 °C) Oral 71 17   23 1810 137/77 98 °F (36.7 °C) Oral 77 --       GENERAL: The patient is a well developed, female who is alert cooperative and oriented times three in no acute distress. CARDIOVASCULAR: RRR  LUNGS: CTA B  ABDOMEN: Her uterus is gravid. She had no complaint of abdominal pain or tenderness. EXTREMITIES:  No calf TTP BLE, no edema BLE, +1 patellar reflexes BLE, no clonus BLE    A fetal ultrasound assessment was performed today. A report is enclosed for your review. Ultrasound 32 weeks 3 days:  SIUP, cephalic, heart rate 256, anterior placenta, ARASELI 13.8 cm, BPP 8/8, umbilical artery PI normal, end-diastolic flow seen, MCA PI normal, CPR PI normal, transvaginal cervical length 3.9 cm without funneling. Placental infarct noted. Review of Systems :   CONSTITUTIONAL : No fever, no chills   HEENT : No headache, no visual changes, no rhinorrhea, no sore throat   CARDIOVASCULAR : No pain, no palpitations, no edema   RESPIRATORY : No pain, no shortness of breath   GASTROINTESTINAL : No N/V, no D/C, no abdominal pain   GENITOURINARY : No dysuria, hematuria and no incontinence   MUSCULOSKELETAL : No myalgia, No back pain  NEUROLOGICAL : No numbness, no tingling, no tremors. No history of seizures    Routine Prenatal on 2023   Component Date Value Ref Range Status    Protein, UA 2023 Negative  Negative Final    Glucose, UA POC 2023 neg   Final          ASSESSMENT/PLAN:  25 y.o.  at 33w3d (7 wk US) with:     Non reactive NST/contractions, improved/resolved -- The patient is a 24 yo G1PO at 33w2d (7 wk US) who is followed by [facility] for abnormal placental function testing. The patient presented to the office today for a follow-up visit. An ultrasound was completed.   There was a single intrauterine gestation in a cephalic

## 2023-03-24 NOTE — PROGRESS NOTES
Dr. Maggy Grey updated on pt. Stated to keep pt on cont monitoring. Ordered to place IV and give 500mL bolus followed by maintenance fluids.

## 2023-03-24 NOTE — PROGRESS NOTES
Plan reviewed with Dr Faby Carballo if tracing is reactive okay to give celestone early and discharge patient home. Discussed plan with Dr Frankie Panda and debbie for discharge as well.

## 2023-03-24 NOTE — PROGRESS NOTES
This RN at bedside. Pt states +FM. Denies any vaginal bleeding or LOF. Denies any s/s of contractions. VSS. Support person at bedside. Call light within reach.

## 2023-03-24 NOTE — PROGRESS NOTES
MFM Note    The FHR tracing was reviewed and has been reassuring/reactive with occasional mild variables. The contractions have resolved. Recommend DRVVT (previously ordered but not done by lab). The patient can have her second dose of BMZ early, 7-8 pm.    If the FHR tracing continues to be reassuring/reactive, she can be discharged home. She is to f/u with her referring provider on Monday as scheduled with an NST. She is to f/u with MFM on Thursday as scheduled. Precautions to call/return sooner were reviewed. Plan reviewed with nursing. Formal note to follow.

## 2023-03-24 NOTE — PROGRESS NOTES
Dr. Faby Carballo updated on pt. Ordered to have pt stay to see MFM again in the morning. Orders for celestone given.

## 2023-03-24 NOTE — DISCHARGE INSTRUCTIONS
Home Undelivered Discharge Instructions    After Discharge Orders:    Future Appointments   Date Time Provider Maurice Nicole   3/27/2023  9:30 AM Lisbet Siegel MD [facility]   3/27[facility]2023 10:[facility]   3/30/2023  9:30 AM Lisbet Siegel MD [facility]   4/3/2023  9:00 AM Lisbet Siegel MD [facility]   4/6/2023  9:30 AM Lisbet Siegel MD [facility]   4/10/2023  9:15 AM Dayan Servin MD [facility]   4/13/2023  9:00 AM Lisbet Siegel MD [facility]   4/17/2023  9:00 AM Lisbet Siegel MD [facility]   4/20/2023  9:30 AM Lisbet Siegel MD [facility]   4/24/2023 10:00 AM Lisbet Siegel MD [facility]   4/27/2023  9:30 [facility]   5/1/2023  9:00 AM MD SCOTT Brand [facility]   5/4/2023  9:30 AM MD SCOTT Brand [facility]   5/8/2023  9:00 AM Lisbet Siegel MD [facility]       Keep appointments as scheduled              Diet:  normal diet as tolerated    Rest: normal activity as tolerated    Other instructions: Do kick counts once a day on your baby. Choose the time of day your baby is most active. Get in a comfortable lying or sitting position and time how long it takes to feel 10 kicks, twists, turns, swishes, or rolls.  Call your physician or midwife if there have not been 10 kicks in 1 hours    Call physician or midwife, return to Labor and Delivery, call 911, or go to the nearest Emergency Room if: increased leakage or fluid, contractions more than  6 per  1 hour, decreased fetal movement, persistent low back pain or cramping, bleeding from vaginal area, difficulty urinating, pain with urination, difficulty breathing, new calf pain, persistent headache, or vision change

## 2023-03-24 NOTE — PROGRESS NOTES
Patient given celestone and given and explained discharge instructions. All questions answered at this time. Patient ambulated off the unit in stable condition.

## 2023-03-25 LAB — LUPUS ANTICOAG DVVT: NORMAL

## 2023-03-27 ENCOUNTER — ROUTINE PRENATAL (OUTPATIENT)
Dept: OBGYN | Age: 23
End: 2023-03-27
Payer: COMMERCIAL

## 2023-03-27 ENCOUNTER — ROUTINE PRENATAL (OUTPATIENT)
Dept: OBGYN CLINIC | Age: 23
End: 2023-03-27
Payer: COMMERCIAL

## 2023-03-27 VITALS
SYSTOLIC BLOOD PRESSURE: 133 MMHG | WEIGHT: 274 LBS | DIASTOLIC BLOOD PRESSURE: 84 MMHG | BODY MASS INDEX: 42.91 KG/M2 | HEART RATE: 74 BPM

## 2023-03-27 VITALS
DIASTOLIC BLOOD PRESSURE: 83 MMHG | WEIGHT: 274.5 LBS | BODY MASS INDEX: 42.99 KG/M2 | SYSTOLIC BLOOD PRESSURE: 135 MMHG | HEART RATE: 107 BPM

## 2023-03-27 DIAGNOSIS — R79.89 LOW VITAMIN D LEVEL: ICD-10-CM

## 2023-03-27 DIAGNOSIS — R94.8 ABNORMAL PLACENTA FUNCTION TEST: ICD-10-CM

## 2023-03-27 DIAGNOSIS — Z34.93 ENCOUNTER FOR SUPERVISION OF NORMAL PREGNANCY IN THIRD TRIMESTER, UNSPECIFIED GRAVIDITY: Primary | ICD-10-CM

## 2023-03-27 DIAGNOSIS — E66.01 CLASS 3 SEVERE OBESITY DUE TO EXCESS CALORIES WITHOUT SERIOUS COMORBIDITY WITH BODY MASS INDEX (BMI) OF 40.0 TO 44.9 IN ADULT (HCC): ICD-10-CM

## 2023-03-27 DIAGNOSIS — Z3A.33 33 WEEKS GESTATION OF PREGNANCY: Primary | ICD-10-CM

## 2023-03-27 PROBLEM — R79.0 LOW FERRITIN: Status: ACTIVE | Noted: 2023-03-27

## 2023-03-27 LAB
GLUCOSE URINE, POC: NEGATIVE
PROTEIN UA: NEGATIVE

## 2023-03-27 PROCEDURE — 0502F SUBSEQUENT PRENATAL CARE: CPT | Performed by: ADVANCED PRACTICE MIDWIFE

## 2023-03-27 PROCEDURE — 99213 OFFICE O/P EST LOW 20 MIN: CPT | Performed by: OBSTETRICS & GYNECOLOGY

## 2023-03-27 PROCEDURE — 81002 URINALYSIS NONAUTO W/O SCOPE: CPT | Performed by: OBSTETRICS & GYNECOLOGY

## 2023-03-27 PROCEDURE — 99213 OFFICE O/P EST LOW 20 MIN: CPT | Performed by: ADVANCED PRACTICE MIDWIFE

## 2023-03-27 PROCEDURE — 59025 FETAL NON-STRESS TEST: CPT | Performed by: OBSTETRICS & GYNECOLOGY

## 2023-03-27 PROCEDURE — 99212 OFFICE O/P EST SF 10 MIN: CPT | Performed by: OBSTETRICS & GYNECOLOGY

## 2023-03-27 NOTE — PATIENT INSTRUCTIONS

## 2023-03-27 NOTE — PROGRESS NOTES
NON STRESS TEST INTERPRETATION    3/27/23    RE:  Ulices Mercado   : 2000   AGE: 25 y.o.     GESTATIONAL AGE:  33w6d    DIAGNOSIS:   BMI 42, abnormal umbilical artery Doppler studies    INDICATION:  Same as above    TIME ON:  0937      TIME OFF:  1015      RESULT:   REACTIVE      FHR Baseline Rate:   140 bpm    PERIODIC CHANGES:    Accelerations present, variability moderate  Occasional mild variable decelerations noted  Occasional contractions noted    COMMENTS:      She is to continue having NST's every 3-4 days, and BPP with umbilical artery doppler studies once per week        Jennifer Pryor MD, Kathleen Waldrop
Pt here for NST  Pt denies any bleeding/cramping  Pt states good fetal movement
intermittent variables and contractions. The FHR tracing did become reactive. However, there continued to be contractions and intermittent variable decelerations. The recommendation was made for the patient to go to antepartum for additional evaluation. The patient continued to have contractions after arrival to antepartum. The fetal heart rate tracing was reassuring and reactive. The recommendation was made for the patient to stay overnight for prolonged monitoring and IV fluid hydration secondary to the contractions. She received betamethasone on 3/24-3/25 (the second dose was administered a few hours early on 3/24). The patient's contractions resolved with IV fluid hydration. The patient was discharged home in stable condition. She returns to the office today. She denies having any issues since discharge from the hospital.  Fetal heart rate tracing was reactive. She was counseled to follow-up in 3 days as scheduled. --I requested the patient return for a follow-up assessment in 3 days unless there is a clinical reason for her to return prior to that time. She is to call if she has any problems or questions prior to her next visit. Further evaluation and management will be dependent on her clinical presentation and the results of her testing. --The patient was advised to call if she has any increased vaginal discharge, vaginal bleeding, contractions, abdominal pain, back pain or any new significant symptomatology prior to her next visit. I advised her that these are signs and symptoms of cervical change and require follow-up assessment when they occur. Preeclampsia precautions were also reviewed with the patient. --The patient is to continue to follow with you in your office for ongoing obstetric care. --The total time spent on today's visit was 10 minutes.   This included preparation for the visit (i.e. reviewing prior external notes and test results), performance of a

## 2023-03-27 NOTE — PROGRESS NOTES
Patient alert and pleasant with no complaints. Here today for prenatal visit. Fetal heart tones not obtained, had NST aat MFM this morning. Urine for glucose and protein done in MFM. Discharge instructions have been discussed with the patient. Patient advised to call our office with any questions or concerns. Voiced understanding.

## 2023-03-27 NOTE — PROGRESS NOTES
Pradip Sanchez is a 25 y.o. female 32w10d        OB History    Para Term  AB Living   1             SAB IAB Ectopic Molar Multiple Live Births                    # Outcome Date GA Lbr Naveed/2nd Weight Sex Delivery Anes PTL Lv   1 Current                  Mother's Prenatal Vitals  BP: 133/84  Weight: 274 lb (124.3 kg)  Heart Rate: 74      The patient was seen and evaluated. There was positive fetal movements. No contractions or leakage of fluid. Signs and symptoms of  labor as well as labor were reviewed. The S/S of Pre-Eclampsia were reviewed with the patient in detail. She is to report any of these if they occur. She currently denies any of these. Assessment:  1. Pradip Sanchez is a 25 y.o. female  2.   3. 33w6d  4. MFM visit prior to this visit, see their notes for findings. Patient Active Problem List    Diagnosis Date Noted    Nutritional deficiency 2023     Priority: Medium    Low vitamin D level 2023     Priority: Medium    Vitamin B12 deficiency 2023     Priority: Medium    Abnormal placenta function test 2023     Priority: Medium    Urine protein increased 2023     Priority: Medium    Encounter for supervision of normal first pregnancy, second trimester 2023     Priority: Medium    Class 3 severe obesity due to excess calories without serious comorbidity with body mass index (BMI) of 40.0 to 44.9 in adult [facility]) 2023     Priority: Medium    Epistaxis 2022     Priority: Medium    Low ferritin 2023    33 weeks gestation of pregnancy 2023        Diagnosis Orders   1. Encounter for supervision of normal pregnancy in third trimester, unspecified                   Plan:  The patient will return to the office for her next visit in 2 weeks. See antepartum flow sheet.

## 2023-03-30 ENCOUNTER — ANCILLARY PROCEDURE (OUTPATIENT)
Dept: OBGYN CLINIC | Age: 23
End: 2023-03-30
Payer: COMMERCIAL

## 2023-03-30 PROCEDURE — 76820 UMBILICAL ARTERY ECHO: CPT | Performed by: OBSTETRICS & GYNECOLOGY

## 2023-03-30 PROCEDURE — 76815 OB US LIMITED FETUS(S): CPT | Performed by: OBSTETRICS & GYNECOLOGY

## 2023-03-30 PROCEDURE — 76821 MIDDLE CEREBRAL ARTERY ECHO: CPT | Performed by: OBSTETRICS & GYNECOLOGY

## 2023-03-30 PROCEDURE — 76818 FETAL BIOPHYS PROFILE W/NST: CPT | Performed by: OBSTETRICS & GYNECOLOGY

## 2023-04-03 ENCOUNTER — ROUTINE PRENATAL (OUTPATIENT)
Dept: OBGYN CLINIC | Age: 23
End: 2023-04-03
Payer: COMMERCIAL

## 2023-04-03 VITALS
SYSTOLIC BLOOD PRESSURE: 128 MMHG | WEIGHT: 271.25 LBS | BODY MASS INDEX: 42.48 KG/M2 | HEART RATE: 70 BPM | DIASTOLIC BLOOD PRESSURE: 84 MMHG

## 2023-04-03 DIAGNOSIS — R94.8 ABNORMAL PLACENTA FUNCTION TEST: ICD-10-CM

## 2023-04-03 DIAGNOSIS — E66.01 CLASS 3 SEVERE OBESITY DUE TO EXCESS CALORIES WITHOUT SERIOUS COMORBIDITY WITH BODY MASS INDEX (BMI) OF 40.0 TO 44.9 IN ADULT (HCC): ICD-10-CM

## 2023-04-03 DIAGNOSIS — Z3A.34 34 WEEKS GESTATION OF PREGNANCY: Primary | ICD-10-CM

## 2023-04-03 LAB
GLUCOSE URINE, POC: NEGATIVE
PROTEIN UA: POSITIVE

## 2023-04-03 PROCEDURE — 59025 FETAL NON-STRESS TEST: CPT | Performed by: OBSTETRICS & GYNECOLOGY

## 2023-04-03 PROCEDURE — 81002 URINALYSIS NONAUTO W/O SCOPE: CPT | Performed by: OBSTETRICS & GYNECOLOGY

## 2023-04-03 PROCEDURE — 99213 OFFICE O/P EST LOW 20 MIN: CPT | Performed by: OBSTETRICS & GYNECOLOGY

## 2023-04-03 NOTE — PATIENT INSTRUCTIONS

## 2023-04-03 NOTE — PROGRESS NOTES
NON STRESS TEST INTERPRETATION    4/3/23    RE:  Mammie Najjar   : 2000   AGE: 25 y.o.     GESTATIONAL AGE:  34w6d    DIAGNOSIS:   Abnormal umbilical artery Dopplers, BMI 42    INDICATION:  Same as above    TIME ON:  922      TIME OFF:  950      RESULT:   REACTIVE      FHR Baseline Rate:   140 bpm    PERIODIC CHANGES:    Accelerations present, variability moderate, no decelerations noted    COMMENTS:      She is to continue having NST's every 3-4 days, and BPP with umbilical artery doppler studies once per week        Kathryn Turner MD, Thor Herndon

## 2023-04-05 PROBLEM — O47.00 PRETERM UTERINE CONTRACTIONS: Status: ACTIVE | Noted: 2023-04-05

## 2023-04-06 ENCOUNTER — ROUTINE PRENATAL (OUTPATIENT)
Dept: OBGYN CLINIC | Age: 23
End: 2023-04-06
Payer: COMMERCIAL

## 2023-04-06 VITALS
BODY MASS INDEX: 42.91 KG/M2 | HEART RATE: 76 BPM | DIASTOLIC BLOOD PRESSURE: 84 MMHG | WEIGHT: 274 LBS | SYSTOLIC BLOOD PRESSURE: 133 MMHG

## 2023-04-06 DIAGNOSIS — E66.01 CLASS 3 SEVERE OBESITY DUE TO EXCESS CALORIES WITHOUT SERIOUS COMORBIDITY WITH BODY MASS INDEX (BMI) OF 40.0 TO 44.9 IN ADULT (HCC): ICD-10-CM

## 2023-04-06 DIAGNOSIS — E53.8 VITAMIN B12 DEFICIENCY: ICD-10-CM

## 2023-04-06 DIAGNOSIS — R80.9 URINE PROTEIN INCREASED: ICD-10-CM

## 2023-04-06 DIAGNOSIS — R94.8 ABNORMAL PLACENTA FUNCTION TEST: ICD-10-CM

## 2023-04-06 DIAGNOSIS — Z34.02 ENCOUNTER FOR SUPERVISION OF NORMAL FIRST PREGNANCY, SECOND TRIMESTER: Primary | ICD-10-CM

## 2023-04-06 DIAGNOSIS — R79.0 LOW FERRITIN: ICD-10-CM

## 2023-04-06 DIAGNOSIS — R79.89 LOW VITAMIN D LEVEL: ICD-10-CM

## 2023-04-06 DIAGNOSIS — E63.9 NUTRITIONAL DEFICIENCY: ICD-10-CM

## 2023-04-06 DIAGNOSIS — O47.00 PRETERM UTERINE CONTRACTIONS: ICD-10-CM

## 2023-04-06 LAB
GLUCOSE URINE, POC: NORMAL
PROTEIN UA: NEGATIVE

## 2023-04-06 PROCEDURE — 99213 OFFICE O/P EST LOW 20 MIN: CPT | Performed by: OBSTETRICS & GYNECOLOGY

## 2023-04-06 PROCEDURE — 81002 URINALYSIS NONAUTO W/O SCOPE: CPT | Performed by: OBSTETRICS & GYNECOLOGY

## 2023-04-06 PROCEDURE — 99214 OFFICE O/P EST MOD 30 MIN: CPT | Performed by: OBSTETRICS & GYNECOLOGY

## 2023-04-11 DIAGNOSIS — Z34.02 ENCOUNTER FOR SUPERVISION OF NORMAL FIRST PREGNANCY, SECOND TRIMESTER: ICD-10-CM

## 2023-04-13 ENCOUNTER — HOSPITAL ENCOUNTER (INPATIENT)
Age: 23
LOS: 3 days | Discharge: HOME | End: 2023-04-16
Attending: OBSTETRICS & GYNECOLOGY | Admitting: OBSTETRICS & GYNECOLOGY
Payer: COMMERCIAL

## 2023-04-13 ENCOUNTER — ROUTINE PRENATAL (OUTPATIENT)
Dept: OBGYN CLINIC | Age: 23
End: 2023-04-13
Payer: COMMERCIAL

## 2023-04-13 ENCOUNTER — ANCILLARY PROCEDURE (OUTPATIENT)
Dept: OBGYN CLINIC | Age: 23
End: 2023-04-13
Payer: COMMERCIAL

## 2023-04-13 VITALS
HEART RATE: 81 BPM | WEIGHT: 277 LBS | SYSTOLIC BLOOD PRESSURE: 133 MMHG | DIASTOLIC BLOOD PRESSURE: 85 MMHG | BODY MASS INDEX: 43.38 KG/M2

## 2023-04-13 DIAGNOSIS — R79.0 LOW FERRITIN: ICD-10-CM

## 2023-04-13 DIAGNOSIS — E53.8 VITAMIN B12 DEFICIENCY: ICD-10-CM

## 2023-04-13 DIAGNOSIS — R80.9 URINE PROTEIN INCREASED: ICD-10-CM

## 2023-04-13 DIAGNOSIS — O47.00 PRETERM UTERINE CONTRACTIONS: ICD-10-CM

## 2023-04-13 DIAGNOSIS — E63.9 NUTRITIONAL DEFICIENCY: ICD-10-CM

## 2023-04-13 DIAGNOSIS — R79.89 LOW VITAMIN D LEVEL: ICD-10-CM

## 2023-04-13 DIAGNOSIS — R79.89 LOW VITAMIN D LEVEL: Primary | ICD-10-CM

## 2023-04-13 DIAGNOSIS — Z34.02 ENCOUNTER FOR SUPERVISION OF NORMAL FIRST PREGNANCY, SECOND TRIMESTER: Primary | ICD-10-CM

## 2023-04-13 DIAGNOSIS — O41.03X0 OLIGOHYDRAMNIOS IN THIRD TRIMESTER, SINGLE OR UNSPECIFIED FETUS: ICD-10-CM

## 2023-04-13 DIAGNOSIS — R94.8 ABNORMAL PLACENTA FUNCTION TEST: ICD-10-CM

## 2023-04-13 DIAGNOSIS — E66.01 CLASS 3 SEVERE OBESITY DUE TO EXCESS CALORIES WITHOUT SERIOUS COMORBIDITY WITH BODY MASS INDEX (BMI) OF 40.0 TO 44.9 IN ADULT (HCC): ICD-10-CM

## 2023-04-13 PROBLEM — Z3A.36 36 WEEKS GESTATION OF PREGNANCY: Status: ACTIVE | Noted: 2023-04-13

## 2023-04-13 LAB
ABO + RH BLD: NORMAL
ALBUMIN SERPL-MCNC: 3.4 G/DL (ref 3.5–5.2)
ALP SERPL-CCNC: 182 U/L (ref 35–104)
ALT SERPL-CCNC: 10 U/L (ref 0–32)
AMNISURE, POC: NEGATIVE
AMNISURE, POC: POSITIVE
AMPHET UR QL SCN: NOT DETECTED
ANION GAP SERPL CALCULATED.3IONS-SCNC: 11 MMOL/L (ref 7–16)
AST SERPL-CCNC: 13 U/L (ref 0–31)
BACTERIA URNS QL MICRO: NORMAL /HPF
BARBITURATES UR QL SCN: NOT DETECTED
BENZODIAZ UR QL SCN: NOT DETECTED
BILIRUB SERPL-MCNC: 1.1 MG/DL (ref 0–1.2)
BILIRUB UR QL STRIP: NEGATIVE
BLD GP AB SCN SERPL QL: NORMAL
BUN SERPL-MCNC: 6 MG/DL (ref 6–20)
CALCIUM SERPL-MCNC: 8.7 MG/DL (ref 8.6–10.2)
CANNABINOIDS UR QL SCN: NOT DETECTED
CHLORIDE SERPL-SCNC: 107 MMOL/L (ref 98–107)
CLARITY UR: CLEAR
CO2 SERPL-SCNC: 19 MMOL/L (ref 22–29)
COCAINE UR QL SCN: NOT DETECTED
COLOR UR: YELLOW
CREAT SERPL-MCNC: 0.5 MG/DL (ref 0.5–1)
CREAT UR-MCNC: 169 MG/DL (ref 29–226)
DRUG SCREEN COMMENT UR-IMP: NORMAL
ERYTHROCYTE [DISTWIDTH] IN BLOOD BY AUTOMATED COUNT: 13.6 FL (ref 11.5–15)
FENTANYL SCREEN, URINE: NOT DETECTED
FERRITIN SERPL-MCNC: 31 NG/ML
FOLATE SERPL-MCNC: 18.3 NG/ML (ref 4.8–24.2)
GLUCOSE SERPL-MCNC: 115 MG/DL (ref 74–99)
GLUCOSE UR STRIP-MCNC: NEGATIVE MG/DL
GLUCOSE URINE, POC: NORMAL
HCT VFR BLD AUTO: 36.8 % (ref 34–48)
HGB BLD-MCNC: 12.7 G/DL (ref 11.5–15.5)
HGB UR QL STRIP: NEGATIVE
KETONES UR STRIP-MCNC: NEGATIVE MG/DL
LDH SERPL-CCNC: 202 U/L (ref 135–214)
LEUKOCYTE ESTERASE UR QL STRIP: NEGATIVE
Lab: NORMAL
Lab: NORMAL
MAGNESIUM SERPL-MCNC: 1.8 MG/DL (ref 1.6–2.6)
MCH RBC QN AUTO: 30.9 PG (ref 26–35)
MCHC RBC AUTO-ENTMCNC: 34.5 % (ref 32–34.5)
MCV RBC AUTO: 89.5 FL (ref 80–99.9)
METHADONE UR QL SCN: NOT DETECTED
NEGATIVE QC PASS/FAIL: NORMAL
NEGATIVE QC PASS/FAIL: NORMAL
NITRITE UR QL STRIP: NEGATIVE
OPIATES UR QL SCN: NOT DETECTED
OXYCODONE URINE: NOT DETECTED
PCP UR QL SCN: NOT DETECTED
PH UR STRIP: 6.5 [PH] (ref 5–9)
PLATELET # BLD AUTO: 264 E9/L (ref 130–450)
PMV BLD AUTO: 11.6 FL (ref 7–12)
POSITIVE QC PASS/FAIL: NORMAL
POSITIVE QC PASS/FAIL: NORMAL
POTASSIUM SERPL-SCNC: 3.4 MMOL/L (ref 3.5–5)
PROT SERPL-MCNC: 6.2 G/DL (ref 6.4–8.3)
PROT UR STRIP-MCNC: NEGATIVE MG/DL
PROT UR-MCNC: 20 MG/DL (ref 0–12)
PROTEIN UA: POSITIVE
PROTEIN/CREAT RATIO: 0.1
PROTEIN/CREAT RATIO: 0.1 (ref 0–0.2)
RBC # BLD AUTO: 4.11 E12/L (ref 3.5–5.5)
RBC #/AREA URNS HPF: NORMAL /HPF (ref 0–2)
SODIUM SERPL-SCNC: 137 MMOL/L (ref 132–146)
SP GR UR STRIP: 1.02 (ref 1–1.03)
T4 FREE SERPL-MCNC: 0.94 NG/DL (ref 0.93–1.7)
TSH SERPL-MCNC: 1.19 UIU/ML (ref 0.27–4.2)
URATE SERPL-MCNC: 5.1 MG/DL (ref 2.4–5.7)
UROBILINOGEN UR STRIP-ACNC: 1 E.U./DL
VIT B12 SERPL-MCNC: 328 PG/ML (ref 211–946)
VITAMIN D 25-HYDROXY: 31 NG/ML (ref 30–100)
WBC # BLD: 11.2 E9/L (ref 4.5–11.5)
WBC #/AREA URNS HPF: NORMAL /HPF (ref 0–5)

## 2023-04-13 PROCEDURE — 84156 ASSAY OF PROTEIN URINE: CPT

## 2023-04-13 PROCEDURE — 86900 BLOOD TYPING SEROLOGIC ABO: CPT

## 2023-04-13 PROCEDURE — 86850 RBC ANTIBODY SCREEN: CPT

## 2023-04-13 PROCEDURE — 82746 ASSAY OF FOLIC ACID SERUM: CPT

## 2023-04-13 PROCEDURE — 76818 FETAL BIOPHYS PROFILE W/NST: CPT | Performed by: OBSTETRICS & GYNECOLOGY

## 2023-04-13 PROCEDURE — 85027 COMPLETE CBC AUTOMATED: CPT

## 2023-04-13 PROCEDURE — 76821 MIDDLE CEREBRAL ARTERY ECHO: CPT | Performed by: OBSTETRICS & GYNECOLOGY

## 2023-04-13 PROCEDURE — 82570 ASSAY OF URINE CREATININE: CPT

## 2023-04-13 PROCEDURE — 87088 URINE BACTERIA CULTURE: CPT

## 2023-04-13 PROCEDURE — 80307 DRUG TEST PRSMV CHEM ANLYZR: CPT

## 2023-04-13 PROCEDURE — 2580000003 HC RX 258: Performed by: OBSTETRICS & GYNECOLOGY

## 2023-04-13 PROCEDURE — 76820 UMBILICAL ARTERY ECHO: CPT | Performed by: OBSTETRICS & GYNECOLOGY

## 2023-04-13 PROCEDURE — 36415 COLL VENOUS BLD VENIPUNCTURE: CPT

## 2023-04-13 PROCEDURE — 80053 COMPREHEN METABOLIC PANEL: CPT

## 2023-04-13 PROCEDURE — 76815 OB US LIMITED FETUS(S): CPT | Performed by: OBSTETRICS & GYNECOLOGY

## 2023-04-13 PROCEDURE — 6370000000 HC RX 637 (ALT 250 FOR IP): Performed by: OBSTETRICS & GYNECOLOGY

## 2023-04-13 PROCEDURE — 81001 URINALYSIS AUTO W/SCOPE: CPT

## 2023-04-13 PROCEDURE — 84443 ASSAY THYROID STIM HORMONE: CPT

## 2023-04-13 PROCEDURE — 99213 OFFICE O/P EST LOW 20 MIN: CPT | Performed by: OBSTETRICS & GYNECOLOGY

## 2023-04-13 PROCEDURE — 83735 ASSAY OF MAGNESIUM: CPT

## 2023-04-13 PROCEDURE — 99215 OFFICE O/P EST HI 40 MIN: CPT | Performed by: OBSTETRICS & GYNECOLOGY

## 2023-04-13 PROCEDURE — 81002 URINALYSIS NONAUTO W/O SCOPE: CPT | Performed by: OBSTETRICS & GYNECOLOGY

## 2023-04-13 PROCEDURE — 83615 LACTATE (LD) (LDH) ENZYME: CPT

## 2023-04-13 PROCEDURE — 82306 VITAMIN D 25 HYDROXY: CPT

## 2023-04-13 PROCEDURE — 99221 1ST HOSP IP/OBS SF/LOW 40: CPT | Performed by: PHYSICIAN ASSISTANT

## 2023-04-13 PROCEDURE — 84550 ASSAY OF BLOOD/URIC ACID: CPT

## 2023-04-13 PROCEDURE — 6360000002 HC RX W HCPCS: Performed by: OBSTETRICS & GYNECOLOGY

## 2023-04-13 PROCEDURE — 84439 ASSAY OF FREE THYROXINE: CPT

## 2023-04-13 PROCEDURE — 82728 ASSAY OF FERRITIN: CPT

## 2023-04-13 PROCEDURE — 86901 BLOOD TYPING SEROLOGIC RH(D): CPT

## 2023-04-13 PROCEDURE — 1220000001 HC SEMI PRIVATE L&D R&B

## 2023-04-13 PROCEDURE — 82607 VITAMIN B-12: CPT

## 2023-04-13 RX ORDER — MEPERIDINE HYDROCHLORIDE 25 MG/ML
25 INJECTION INTRAMUSCULAR; INTRAVENOUS; SUBCUTANEOUS EVERY 4 HOURS PRN
Status: DISPENSED | OUTPATIENT
Start: 2023-04-13 | End: 2023-04-15

## 2023-04-13 RX ORDER — MISOPROSTOL 200 UG/1
800 TABLET ORAL PRN
Status: DISCONTINUED | OUTPATIENT
Start: 2023-04-13 | End: 2023-04-16 | Stop reason: HOSPADM

## 2023-04-13 RX ORDER — SODIUM CHLORIDE, SODIUM LACTATE, POTASSIUM CHLORIDE, AND CALCIUM CHLORIDE .6; .31; .03; .02 G/100ML; G/100ML; G/100ML; G/100ML
1000 INJECTION, SOLUTION INTRAVENOUS PRN
Status: DISCONTINUED | OUTPATIENT
Start: 2023-04-13 | End: 2023-04-16 | Stop reason: HOSPADM

## 2023-04-13 RX ORDER — SODIUM CHLORIDE, SODIUM LACTATE, POTASSIUM CHLORIDE, CALCIUM CHLORIDE 600; 310; 30; 20 MG/100ML; MG/100ML; MG/100ML; MG/100ML
INJECTION, SOLUTION INTRAVENOUS CONTINUOUS
Status: DISCONTINUED | OUTPATIENT
Start: 2023-04-13 | End: 2023-04-16 | Stop reason: HOSPADM

## 2023-04-13 RX ORDER — METHYLERGONOVINE MALEATE 0.2 MG/ML
200 INJECTION INTRAVENOUS PRN
Status: DISCONTINUED | OUTPATIENT
Start: 2023-04-13 | End: 2023-04-16 | Stop reason: HOSPADM

## 2023-04-13 RX ORDER — ONDANSETRON 2 MG/ML
4 INJECTION INTRAMUSCULAR; INTRAVENOUS EVERY 6 HOURS PRN
Status: DISCONTINUED | OUTPATIENT
Start: 2023-04-13 | End: 2023-04-16 | Stop reason: HOSPADM

## 2023-04-13 RX ORDER — CARBOPROST TROMETHAMINE 250 UG/ML
250 INJECTION, SOLUTION INTRAMUSCULAR PRN
Status: DISCONTINUED | OUTPATIENT
Start: 2023-04-13 | End: 2023-04-16 | Stop reason: HOSPADM

## 2023-04-13 RX ORDER — BETAMETHASONE SODIUM PHOSPHATE AND BETAMETHASONE ACETATE 3; 3 MG/ML; MG/ML
12 INJECTION, SUSPENSION INTRA-ARTICULAR; INTRALESIONAL; INTRAMUSCULAR; SOFT TISSUE ONCE
Status: COMPLETED | OUTPATIENT
Start: 2023-04-13 | End: 2023-04-13

## 2023-04-13 RX ORDER — SODIUM CHLORIDE, SODIUM LACTATE, POTASSIUM CHLORIDE, AND CALCIUM CHLORIDE .6; .31; .03; .02 G/100ML; G/100ML; G/100ML; G/100ML
500 INJECTION, SOLUTION INTRAVENOUS PRN
Status: DISCONTINUED | OUTPATIENT
Start: 2023-04-13 | End: 2023-04-16 | Stop reason: HOSPADM

## 2023-04-13 RX ADMIN — WATER 1000 MG: 1 INJECTION INTRAMUSCULAR; INTRAVENOUS; SUBCUTANEOUS at 23:21

## 2023-04-13 RX ADMIN — WATER 2000 MG: 1 INJECTION INTRAMUSCULAR; INTRAVENOUS; SUBCUTANEOUS at 15:48

## 2023-04-13 RX ADMIN — BETAMETHASONE SODIUM PHOSPHATE AND BETAMETHASONE ACETATE 12 MG: 3; 3 INJECTION, SUSPENSION INTRA-ARTICULAR; INTRALESIONAL; INTRAMUSCULAR at 15:50

## 2023-04-13 RX ADMIN — Medication 25 MCG: at 21:26

## 2023-04-13 RX ADMIN — Medication 25 MCG: at 15:04

## 2023-04-14 LAB — GP B STREP SPEC QL CULT: NORMAL

## 2023-04-14 PROCEDURE — 6360000002 HC RX W HCPCS: Performed by: MIDWIFE

## 2023-04-14 PROCEDURE — 2580000003 HC RX 258: Performed by: OBSTETRICS & GYNECOLOGY

## 2023-04-14 PROCEDURE — 3E0P7VZ INTRODUCTION OF HORMONE INTO FEMALE REPRODUCTIVE, VIA NATURAL OR ARTIFICIAL OPENING: ICD-10-PCS | Performed by: ADVANCED PRACTICE MIDWIFE

## 2023-04-14 PROCEDURE — 0HQ9XZZ REPAIR PERINEUM SKIN, EXTERNAL APPROACH: ICD-10-PCS | Performed by: ADVANCED PRACTICE MIDWIFE

## 2023-04-14 PROCEDURE — 2500000003 HC RX 250 WO HCPCS: Performed by: ANESTHESIOLOGY

## 2023-04-14 PROCEDURE — 7200000001 HC VAGINAL DELIVERY

## 2023-04-14 PROCEDURE — 51701 INSERT BLADDER CATHETER: CPT

## 2023-04-14 PROCEDURE — 2580000003 HC RX 258: Performed by: ADVANCED PRACTICE MIDWIFE

## 2023-04-14 PROCEDURE — 6360000002 HC RX W HCPCS: Performed by: OBSTETRICS & GYNECOLOGY

## 2023-04-14 PROCEDURE — 1220000000 HC SEMI PRIVATE OB R&B

## 2023-04-14 PROCEDURE — 3E033VJ INTRODUCTION OF OTHER HORMONE INTO PERIPHERAL VEIN, PERCUTANEOUS APPROACH: ICD-10-PCS | Performed by: ADVANCED PRACTICE MIDWIFE

## 2023-04-14 PROCEDURE — 2500000003 HC RX 250 WO HCPCS

## 2023-04-14 PROCEDURE — 88307 TISSUE EXAM BY PATHOLOGIST: CPT

## 2023-04-14 PROCEDURE — 6370000000 HC RX 637 (ALT 250 FOR IP): Performed by: ADVANCED PRACTICE MIDWIFE

## 2023-04-14 RX ORDER — SODIUM CHLORIDE 0.9 % (FLUSH) 0.9 %
5-40 SYRINGE (ML) INJECTION PRN
Status: DISCONTINUED | OUTPATIENT
Start: 2023-04-14 | End: 2023-04-16 | Stop reason: HOSPADM

## 2023-04-14 RX ORDER — ONDANSETRON 2 MG/ML
4 INJECTION INTRAMUSCULAR; INTRAVENOUS EVERY 6 HOURS PRN
Status: DISCONTINUED | OUTPATIENT
Start: 2023-04-14 | End: 2023-04-14 | Stop reason: HOSPADM

## 2023-04-14 RX ORDER — SIMETHICONE 80 MG
80 TABLET,CHEWABLE ORAL EVERY 6 HOURS PRN
Status: DISCONTINUED | OUTPATIENT
Start: 2023-04-14 | End: 2023-04-16 | Stop reason: HOSPADM

## 2023-04-14 RX ORDER — SODIUM CHLORIDE 9 MG/ML
INJECTION, SOLUTION INTRAVENOUS PRN
Status: DISCONTINUED | OUTPATIENT
Start: 2023-04-14 | End: 2023-04-16 | Stop reason: HOSPADM

## 2023-04-14 RX ORDER — LIDOCAINE HYDROCHLORIDE 10 MG/ML
INJECTION, SOLUTION INFILTRATION; PERINEURAL
Status: COMPLETED
Start: 2023-04-14 | End: 2023-04-14

## 2023-04-14 RX ORDER — MODIFIED LANOLIN
OINTMENT (GRAM) TOPICAL PRN
Status: DISCONTINUED | OUTPATIENT
Start: 2023-04-14 | End: 2023-04-16 | Stop reason: HOSPADM

## 2023-04-14 RX ORDER — DOCUSATE SODIUM 100 MG/1
100 CAPSULE, LIQUID FILLED ORAL 2 TIMES DAILY
Status: DISCONTINUED | OUTPATIENT
Start: 2023-04-14 | End: 2023-04-16 | Stop reason: HOSPADM

## 2023-04-14 RX ORDER — IBUPROFEN 600 MG/1
600 TABLET ORAL EVERY 8 HOURS SCHEDULED
Status: DISCONTINUED | OUTPATIENT
Start: 2023-04-14 | End: 2023-04-16 | Stop reason: HOSPADM

## 2023-04-14 RX ORDER — NALOXONE HYDROCHLORIDE 0.4 MG/ML
INJECTION, SOLUTION INTRAMUSCULAR; INTRAVENOUS; SUBCUTANEOUS PRN
Status: DISCONTINUED | OUTPATIENT
Start: 2023-04-14 | End: 2023-04-14 | Stop reason: HOSPADM

## 2023-04-14 RX ORDER — SODIUM CHLORIDE 0.9 % (FLUSH) 0.9 %
5-40 SYRINGE (ML) INJECTION EVERY 12 HOURS SCHEDULED
Status: DISCONTINUED | OUTPATIENT
Start: 2023-04-14 | End: 2023-04-16 | Stop reason: HOSPADM

## 2023-04-14 RX ORDER — ACETAMINOPHEN 650 MG
TABLET, EXTENDED RELEASE ORAL
Status: COMPLETED
Start: 2023-04-14 | End: 2023-04-14

## 2023-04-14 RX ADMIN — Medication 166.7 ML: at 14:10

## 2023-04-14 RX ADMIN — Medication 1 MILLI-UNITS/MIN: at 04:39

## 2023-04-14 RX ADMIN — WATER 1000 MG: 1 INJECTION INTRAMUSCULAR; INTRAVENOUS; SUBCUTANEOUS at 08:32

## 2023-04-14 RX ADMIN — MEPERIDINE HYDROCHLORIDE 25 MG: 25 INJECTION, SOLUTION INTRAMUSCULAR; INTRAVENOUS; SUBCUTANEOUS at 00:06

## 2023-04-14 RX ADMIN — LIDOCAINE HYDROCHLORIDE 200 MG: 10 INJECTION, SOLUTION INFILTRATION; PERINEURAL at 14:15

## 2023-04-14 RX ADMIN — DOCUSATE SODIUM 100 MG: 100 CAPSULE, LIQUID FILLED ORAL at 20:41

## 2023-04-14 RX ADMIN — SODIUM CHLORIDE, PRESERVATIVE FREE 10 ML: 5 INJECTION INTRAVENOUS at 17:53

## 2023-04-14 RX ADMIN — Medication 15 ML/HR: at 09:44

## 2023-04-14 RX ADMIN — IBUPROFEN 600 MG: 600 TABLET, FILM COATED ORAL at 23:40

## 2023-04-14 RX ADMIN — SODIUM CHLORIDE, POTASSIUM CHLORIDE, SODIUM LACTATE AND CALCIUM CHLORIDE: 600; 310; 30; 20 INJECTION, SOLUTION INTRAVENOUS at 02:18

## 2023-04-14 RX ADMIN — Medication: at 14:00

## 2023-04-14 RX ADMIN — SODIUM CHLORIDE, PRESERVATIVE FREE 10 ML: 5 INJECTION INTRAVENOUS at 20:42

## 2023-04-14 RX ADMIN — SODIUM CHLORIDE, PRESERVATIVE FREE 10 ML: 5 INJECTION INTRAVENOUS at 20:41

## 2023-04-14 ASSESSMENT — PAIN DESCRIPTION - LOCATION
LOCATION: ABDOMEN
LOCATION: PERINEUM

## 2023-04-14 ASSESSMENT — PAIN DESCRIPTION - ORIENTATION
ORIENTATION: LOWER
ORIENTATION: LOWER

## 2023-04-14 ASSESSMENT — PAIN SCALES - GENERAL
PAINLEVEL_OUTOF10: 2
PAINLEVEL_OUTOF10: 6
PAINLEVEL_OUTOF10: 0

## 2023-04-14 ASSESSMENT — PAIN DESCRIPTION - DESCRIPTORS
DESCRIPTORS: CRAMPING;DISCOMFORT;TIGHTNESS
DESCRIPTORS: ACHING;DISCOMFORT;SORE

## 2023-04-14 ASSESSMENT — PAIN - FUNCTIONAL ASSESSMENT
PAIN_FUNCTIONAL_ASSESSMENT: ACTIVITIES ARE NOT PREVENTED
PAIN_FUNCTIONAL_ASSESSMENT: ACTIVITIES ARE NOT PREVENTED

## 2023-04-15 LAB
HCT VFR BLD AUTO: 36.6 % (ref 34–48)
HGB BLD-MCNC: 12.6 G/DL (ref 11.5–15.5)

## 2023-04-15 PROCEDURE — 1220000000 HC SEMI PRIVATE OB R&B

## 2023-04-15 PROCEDURE — 6370000000 HC RX 637 (ALT 250 FOR IP): Performed by: ADVANCED PRACTICE MIDWIFE

## 2023-04-15 PROCEDURE — 85018 HEMOGLOBIN: CPT

## 2023-04-15 PROCEDURE — 36415 COLL VENOUS BLD VENIPUNCTURE: CPT

## 2023-04-15 PROCEDURE — 85014 HEMATOCRIT: CPT

## 2023-04-15 RX ADMIN — IBUPROFEN 600 MG: 600 TABLET, FILM COATED ORAL at 09:21

## 2023-04-15 RX ADMIN — Medication: at 21:51

## 2023-04-15 RX ADMIN — DOCUSATE SODIUM 100 MG: 100 CAPSULE, LIQUID FILLED ORAL at 09:21

## 2023-04-15 RX ADMIN — DOCUSATE SODIUM 100 MG: 100 CAPSULE, LIQUID FILLED ORAL at 21:51

## 2023-04-15 ASSESSMENT — PAIN SCALES - GENERAL: PAINLEVEL_OUTOF10: 4

## 2023-04-16 VITALS
RESPIRATION RATE: 18 BRPM | HEIGHT: 67 IN | DIASTOLIC BLOOD PRESSURE: 76 MMHG | HEART RATE: 67 BPM | WEIGHT: 275 LBS | SYSTOLIC BLOOD PRESSURE: 132 MMHG | TEMPERATURE: 97.7 F | BODY MASS INDEX: 43.16 KG/M2 | OXYGEN SATURATION: 98 %

## 2023-04-16 LAB — BACTERIA UR CULT: NORMAL

## 2023-04-16 PROCEDURE — 6370000000 HC RX 637 (ALT 250 FOR IP): Performed by: ADVANCED PRACTICE MIDWIFE

## 2023-04-16 RX ORDER — IBUPROFEN 600 MG/1
600 TABLET ORAL EVERY 6 HOURS PRN
Qty: 24 TABLET | Refills: 0 | Status: SHIPPED | OUTPATIENT
Start: 2023-04-16

## 2023-04-16 RX ADMIN — IBUPROFEN 600 MG: 600 TABLET, FILM COATED ORAL at 08:17

## 2023-04-16 RX ADMIN — DOCUSATE SODIUM 100 MG: 100 CAPSULE, LIQUID FILLED ORAL at 09:44

## 2023-04-16 ASSESSMENT — PAIN SCALES - GENERAL: PAINLEVEL_OUTOF10: 2

## 2023-04-24 PROBLEM — O41.03X0 OLIGOHYDRAMNIOS IN THIRD TRIMESTER: Status: ACTIVE | Noted: 2023-04-24

## 2023-05-11 DIAGNOSIS — R79.0 LOW FERRITIN: ICD-10-CM

## 2023-05-11 RX ORDER — FERROUS SULFATE 325(65) MG
TABLET ORAL
Qty: 60 TABLET | Refills: 1 | OUTPATIENT
Start: 2023-05-11

## 2023-05-22 ENCOUNTER — POSTPARTUM VISIT (OUTPATIENT)
Dept: OBGYN | Age: 23
End: 2023-05-22
Payer: MEDICAID

## 2023-05-22 VITALS
DIASTOLIC BLOOD PRESSURE: 88 MMHG | HEART RATE: 84 BPM | BODY MASS INDEX: 40.65 KG/M2 | HEIGHT: 67 IN | WEIGHT: 259 LBS | SYSTOLIC BLOOD PRESSURE: 160 MMHG

## 2023-05-22 DIAGNOSIS — Z12.4 SCREENING FOR CERVICAL CANCER: Primary | ICD-10-CM

## 2023-05-22 PROCEDURE — 99213 OFFICE O/P EST LOW 20 MIN: CPT | Performed by: MIDWIFE

## 2023-05-22 NOTE — PROGRESS NOTES
Pt here for 6 week post partum. Not interested in family planning method at this time. Pt delivered 4/14 and is bottle feeding. Pt denies concerns at this time.    Pt has not had pap

## 2023-05-22 NOTE — PROGRESS NOTES
S:  6 weeks PP  Feels well  Still spotting a little bit here and there, not bad today  Moods  mostly good, a little crying when she is tired and frustrated  Sleeping 3-6 hours at a stretch  Bottle feeding, breasts soft  Not planning any birth control    O:  /104 and 160/88 today. She had another elevated BP in the postpartum period.   Uterus well involuted  Perineum well healed  She is bleeding too much for a PAP today    A:  Elevated blood pressure  Otherwise normal PP 6 weeks    P:  RTO 1 month for PAP    Follow up with PCP re: elevated BP

## 2025-08-12 ENCOUNTER — OFFICE VISIT (OUTPATIENT)
Dept: FAMILY MEDICINE CLINIC | Age: 25
End: 2025-08-12
Payer: MEDICAID

## 2025-08-12 VITALS
TEMPERATURE: 98.4 F | DIASTOLIC BLOOD PRESSURE: 62 MMHG | OXYGEN SATURATION: 98 % | HEIGHT: 67 IN | WEIGHT: 247 LBS | BODY MASS INDEX: 38.77 KG/M2 | HEART RATE: 77 BPM | SYSTOLIC BLOOD PRESSURE: 110 MMHG

## 2025-08-12 DIAGNOSIS — L20.9 ATOPIC DERMATITIS, UNSPECIFIED TYPE: Primary | ICD-10-CM

## 2025-08-12 DIAGNOSIS — L85.8 KERATOSIS PILARIS: ICD-10-CM

## 2025-08-12 PROCEDURE — 99203 OFFICE O/P NEW LOW 30 MIN: CPT | Performed by: PHYSICIAN ASSISTANT

## 2025-08-12 RX ORDER — TRIAMCINOLONE ACETONIDE 40 MG/ML
40 INJECTION, SUSPENSION INTRA-ARTICULAR; INTRAMUSCULAR ONCE
Status: COMPLETED | OUTPATIENT
Start: 2025-08-12 | End: 2025-08-12

## 2025-08-12 RX ORDER — METHYLPREDNISOLONE 4 MG/1
TABLET ORAL
Qty: 1 KIT | Refills: 0 | Status: SHIPPED | OUTPATIENT
Start: 2025-08-12 | End: 2025-08-18

## 2025-08-12 RX ADMIN — TRIAMCINOLONE ACETONIDE 40 MG: 40 INJECTION, SUSPENSION INTRA-ARTICULAR; INTRAMUSCULAR at 10:31

## 2025-08-27 ENCOUNTER — OFFICE VISIT (OUTPATIENT)
Dept: FAMILY MEDICINE CLINIC | Age: 25
End: 2025-08-27
Payer: MEDICAID

## 2025-08-27 VITALS
SYSTOLIC BLOOD PRESSURE: 128 MMHG | WEIGHT: 241 LBS | BODY MASS INDEX: 37.83 KG/M2 | OXYGEN SATURATION: 98 % | HEART RATE: 65 BPM | TEMPERATURE: 98 F | HEIGHT: 67 IN | DIASTOLIC BLOOD PRESSURE: 84 MMHG

## 2025-08-27 DIAGNOSIS — Z32.02 PREGNANCY EXAMINATION OR TEST, NEGATIVE RESULT: Primary | ICD-10-CM

## 2025-08-27 LAB
CONTROL: NORMAL
PREGNANCY TEST URINE, POC: NEGATIVE

## 2025-08-27 PROCEDURE — 81025 URINE PREGNANCY TEST: CPT | Performed by: NURSE PRACTITIONER

## 2025-08-27 PROCEDURE — 99212 OFFICE O/P EST SF 10 MIN: CPT | Performed by: NURSE PRACTITIONER

## 2025-09-05 ENCOUNTER — OFFICE VISIT (OUTPATIENT)
Dept: FAMILY MEDICINE CLINIC | Age: 25
End: 2025-09-05

## 2025-09-05 VITALS
WEIGHT: 243 LBS | HEART RATE: 97 BPM | TEMPERATURE: 97 F | BODY MASS INDEX: 38.14 KG/M2 | SYSTOLIC BLOOD PRESSURE: 130 MMHG | OXYGEN SATURATION: 98 % | HEIGHT: 67 IN | DIASTOLIC BLOOD PRESSURE: 86 MMHG

## 2025-09-05 DIAGNOSIS — H92.01 EARACHE ON RIGHT: ICD-10-CM

## 2025-09-05 DIAGNOSIS — J06.9 UPPER RESPIRATORY TRACT INFECTION, UNSPECIFIED TYPE: ICD-10-CM

## 2025-09-05 DIAGNOSIS — R50.9 FEVER, UNSPECIFIED FEVER CAUSE: ICD-10-CM

## 2025-09-05 DIAGNOSIS — J02.9 SORE THROAT: ICD-10-CM

## 2025-09-05 DIAGNOSIS — R51.9 NONINTRACTABLE HEADACHE, UNSPECIFIED CHRONICITY PATTERN, UNSPECIFIED HEADACHE TYPE: ICD-10-CM

## 2025-09-05 LAB
Lab: NORMAL
PERFORMING INSTRUMENT: NORMAL
QC PASS/FAIL: NORMAL
S PYO AG THROAT QL: NORMAL
SARS-COV-2, POC: NORMAL

## 2025-09-05 RX ORDER — PREDNISONE 10 MG/1
TABLET ORAL
Qty: 12 TABLET | Refills: 0 | Status: SHIPPED | OUTPATIENT
Start: 2025-09-05 | End: 2025-09-11

## 2025-09-05 RX ORDER — DOXYCYCLINE HYCLATE 100 MG
100 TABLET ORAL 2 TIMES DAILY
Qty: 20 TABLET | Refills: 0 | Status: SHIPPED | OUTPATIENT
Start: 2025-09-05 | End: 2025-09-15

## 2025-09-05 ASSESSMENT — ENCOUNTER SYMPTOMS
COUGH: 0
EYES NEGATIVE: 1
SINUS PRESSURE: 0
SORE THROAT: 1
WHEEZING: 0
SHORTNESS OF BREATH: 0
CHEST TIGHTNESS: 0